# Patient Record
Sex: FEMALE | Race: WHITE | NOT HISPANIC OR LATINO | URBAN - METROPOLITAN AREA
[De-identification: names, ages, dates, MRNs, and addresses within clinical notes are randomized per-mention and may not be internally consistent; named-entity substitution may affect disease eponyms.]

---

## 2023-05-05 ENCOUNTER — OFFICE VISIT (OUTPATIENT)
Dept: PRIMARY CARE | Facility: CLINIC | Age: 38
End: 2023-05-05
Payer: COMMERCIAL

## 2023-05-05 VITALS
TEMPERATURE: 98.1 F | HEART RATE: 93 BPM | SYSTOLIC BLOOD PRESSURE: 136 MMHG | DIASTOLIC BLOOD PRESSURE: 83 MMHG | WEIGHT: 218.6 LBS | BODY MASS INDEX: 35.28 KG/M2

## 2023-05-05 DIAGNOSIS — J01.90 ACUTE NON-RECURRENT SINUSITIS, UNSPECIFIED LOCATION: ICD-10-CM

## 2023-05-05 DIAGNOSIS — H10.33 ACUTE BACTERIAL CONJUNCTIVITIS OF BOTH EYES: Primary | ICD-10-CM

## 2023-05-05 PROCEDURE — 99204 OFFICE O/P NEW MOD 45 MIN: CPT | Performed by: FAMILY MEDICINE

## 2023-05-05 PROCEDURE — 1036F TOBACCO NON-USER: CPT | Performed by: FAMILY MEDICINE

## 2023-05-05 RX ORDER — POLYMYXIN B SULFATE AND TRIMETHOPRIM 1; 10000 MG/ML; [USP'U]/ML
1 SOLUTION OPHTHALMIC EVERY 6 HOURS
Qty: 2 ML | Refills: 0 | COMMUNITY
Start: 2023-05-02 | End: 2023-05-09

## 2023-05-05 RX ORDER — GABAPENTIN 300 MG/1
CAPSULE ORAL
COMMUNITY
Start: 2022-11-30 | End: 2023-10-20 | Stop reason: SDUPTHER

## 2023-05-05 RX ORDER — AMOXICILLIN 875 MG/1
875 TABLET, FILM COATED ORAL 2 TIMES DAILY
Qty: 20 TABLET | Refills: 0 | Status: SHIPPED | OUTPATIENT
Start: 2023-05-05 | End: 2023-05-15

## 2023-05-05 RX ORDER — TOBRAMYCIN AND DEXAMETHASONE 3; 1 MG/ML; MG/ML
1 SUSPENSION/ DROPS OPHTHALMIC
Qty: 2.5 ML | Refills: 0 | Status: SHIPPED | OUTPATIENT
Start: 2023-05-05 | End: 2023-05-15

## 2023-05-05 RX ORDER — HYDROXYZINE PAMOATE 50 MG/1
CAPSULE ORAL
COMMUNITY
Start: 2022-10-28 | End: 2023-10-20

## 2023-05-05 NOTE — PROGRESS NOTES
Subjective   Patient ID: Florecita Redmond is a 37 y.o. female who presents for Establish Care.  HPI  The patient is here to establish care and for the management of bilateral eye irritation that started few days ago and has not responded to Polytrim eye drops used for the past 3 days. Associated to frontal and maxillary bilateral pressure/ pain, not responding to Flonase and Zyrtec.     Review of system was completed.  All systems were reviewed and were normal, except for the ones that are listed in the HPI.    Objective   Physical Exam  Constitutional:       Appearance: Normal appearance.   HENT:      Head: Normocephalic and atraumatic.      Right Ear: Tympanic membrane, ear canal and external ear normal.      Left Ear: Tympanic membrane, ear canal and external ear normal.      Nose: Nose normal.      Mouth/Throat:      Mouth: Mucous membranes are moist.      Pharynx: Oropharynx is clear.   Eyes:      General:         Right eye: Right eye discharge: ciprodex.      Extraocular Movements: Extraocular movements intact.      Pupils: Pupils are equal, round, and reactive to light.      Comments: Bilateral mild erythema of the conjunctiva. Left eyelids are edematous mildly.   Cardiovascular:      Rate and Rhythm: Normal rate and regular rhythm.      Pulses: Normal pulses.   Pulmonary:      Effort: Pulmonary effort is normal.      Breath sounds: Normal breath sounds.   Abdominal:      General: Abdomen is flat. Bowel sounds are normal.      Palpations: Abdomen is soft.   Musculoskeletal:         General: Normal range of motion.      Cervical back: Normal range of motion and neck supple.   Skin:     General: Skin is warm.   Neurological:      General: No focal deficit present.      Mental Status: She is alert and oriented to person, place, and time. Mental status is at baseline.   Psychiatric:         Mood and Affect: Mood normal.         Behavior: Behavior normal.         Thought Content: Thought content normal.          Judgment: Judgment normal.         Assessment/Plan   Problem List Items Addressed This Visit          Infectious/Inflammatory    Acute bacterial conjunctivitis of both eyes - Primary    Relevant Medications    tobramycin-dexamethasone (Tobradex) ophthalmic suspension    Acute non-recurrent sinusitis    Relevant Medications    amoxicillin (Amoxil) 875 mg tablet

## 2023-10-19 PROBLEM — F32.A ANXIETY AND DEPRESSION: Status: ACTIVE | Noted: 2018-05-23

## 2023-10-19 PROBLEM — H66.92 LEFT ACUTE OTITIS MEDIA: Status: ACTIVE | Noted: 2023-10-19

## 2023-10-19 PROBLEM — F32.9 MAJOR DEPRESSIVE DISORDER: Status: ACTIVE | Noted: 2018-08-03

## 2023-10-19 PROBLEM — F33.9 DEPRESSION, MAJOR, RECURRENT (CMS-HCC): Status: ACTIVE | Noted: 2023-10-19

## 2023-10-19 PROBLEM — G47.00 INSOMNIA: Status: ACTIVE | Noted: 2023-10-19

## 2023-10-19 PROBLEM — B00.9 HSV-2 INFECTION: Status: ACTIVE | Noted: 2018-05-23

## 2023-10-19 PROBLEM — R00.2 PALPITATIONS: Status: ACTIVE | Noted: 2023-10-19

## 2023-10-19 PROBLEM — F41.9 ANXIETY AND DEPRESSION: Status: ACTIVE | Noted: 2018-05-23

## 2023-10-19 PROBLEM — E66.811 OBESITY (BMI 30.0-34.9): Status: ACTIVE | Noted: 2018-05-25

## 2023-10-19 PROBLEM — H52.203 ASTIGMATISM, BILATERAL: Status: ACTIVE | Noted: 2023-10-19

## 2023-10-19 PROBLEM — I47.10 SUPRAVENTRICULAR TACHYCARDIA (CMS-HCC): Status: ACTIVE | Noted: 2018-09-25

## 2023-10-19 PROBLEM — E66.9 OBESITY (BMI 30.0-34.9): Status: ACTIVE | Noted: 2018-05-25

## 2023-10-19 RX ORDER — TRAZODONE HYDROCHLORIDE 100 MG/1
TABLET ORAL
COMMUNITY
Start: 2023-04-29 | End: 2024-04-05 | Stop reason: SDUPTHER

## 2023-10-19 RX ORDER — HYDROXYZINE HYDROCHLORIDE 50 MG/1
1 TABLET, FILM COATED ORAL NIGHTLY
COMMUNITY
Start: 2022-11-30 | End: 2023-10-20

## 2023-10-20 ENCOUNTER — TELEMEDICINE (OUTPATIENT)
Dept: BEHAVIORAL HEALTH | Facility: CLINIC | Age: 38
End: 2023-10-20
Payer: COMMERCIAL

## 2023-10-20 DIAGNOSIS — F51.01 PRIMARY INSOMNIA: ICD-10-CM

## 2023-10-20 DIAGNOSIS — F32.A ANXIETY AND DEPRESSION: ICD-10-CM

## 2023-10-20 DIAGNOSIS — F33.42 RECURRENT MAJOR DEPRESSIVE DISORDER, IN FULL REMISSION (CMS-HCC): ICD-10-CM

## 2023-10-20 DIAGNOSIS — F41.9 ANXIETY AND DEPRESSION: ICD-10-CM

## 2023-10-20 PROCEDURE — 99213 OFFICE O/P EST LOW 20 MIN: CPT

## 2023-10-20 RX ORDER — GABAPENTIN 300 MG/1
CAPSULE ORAL
Qty: 1 CAPSULE | Refills: 0 | Status: SHIPPED | OUTPATIENT
Start: 2023-10-20 | End: 2024-01-19 | Stop reason: SDUPTHER

## 2023-10-20 RX ORDER — VENLAFAXINE HYDROCHLORIDE 150 MG/1
150 CAPSULE, EXTENDED RELEASE ORAL
COMMUNITY
Start: 2023-07-28 | End: 2024-04-05 | Stop reason: SDUPTHER

## 2023-10-20 ASSESSMENT — ENCOUNTER SYMPTOMS
CARDIOVASCULAR NEGATIVE: 1
CONSTITUTIONAL NEGATIVE: 1
INSOMNIA: 1
MUSCULOSKELETAL NEGATIVE: 1
NEUROLOGICAL NEGATIVE: 1
GASTROINTESTINAL NEGATIVE: 1
RESPIRATORY NEGATIVE: 1

## 2023-10-20 NOTE — PATIENT INSTRUCTIONS
Plan  - Continue venlafaxine  mg daily for depression and anxiety  - Continue trazodone 100 mg at bedtime for sleep  - Continue gabapentin 300 mg at bedtime for sleep  - Follow up with physical health providers as scheduled    - May follow up sooner if experiences worsening symptoms by calling  Psychiatry at [phone]  *Please call the [facility] crisis hotline at 348 or call 911 or go to your closest Emergency Room if you feel worse. This includes thoughts of hurting yourself or anyone else, or having other troubles such as hearing voices, seeing visions, or having new and scary thoughts about the people around you.

## 2023-10-20 NOTE — PROGRESS NOTES
Subjective   Patient ID: Florecita Redmond is a 38 y.o. female who presents for Anxiety, MDD (Major Depressive Disorder), and Insomnia.    Things are going well, doing better with the change in weather. Enjoying the fall. Using her light lamp at work. Denies depressive sx. Denies SI/HI.  Anxiety is pretty good, normal level. Manageable. Denies panic attacks.     Sleep is good, easier to go to bed with the changing weather. Was able to get up and go to the gym in the morning before work.         Anxiety  Symptoms include insomnia.       Insomnia      Psychiatric Review Of Systems:  Depressive Symptoms:  denies  Manic Symptoms:Other: (comment) denies  Anxiety Symptoms:Other: (comment) at baseline, manageable    Disordered Eating Symptoms:Other: (comment) none reported  Inattentive Symptoms:Other: (comment) none reported    Trauma Symptoms:  denies  Psychotic Symptoms:Other: (comment) denies  Other Symptoms/Concerns:Other: (comments) none reported  Delirium/Altered Mental Status Symptoms:Other: (comment) WNL    Review of Systems   Constitutional: Negative.    HENT: Negative.     Respiratory: Negative.     Cardiovascular: Negative.    Gastrointestinal: Negative.    Musculoskeletal: Negative.    Neurological: Negative.    Psychiatric/Behavioral:  The patient has insomnia.      Past Psychiatric History   Onset History: Depression/anxiety at 16 yo.   Inpatient history: 2022 [place].   Suicide attempts/Self-Harm/Ideation History: SI in 2022.   Past providers: Lisa Al NP, Dr Dick De Los Santos, [facility].   Past medication trials: Viibryd- made depression worse, Lexapro- not effective, Latuda- 1st time helped, 2nd time not effective, bupropion  mg possible tinnitus, buspirone 5 mg BID, trazodone 50 mg, hydroxyzine 50 mg.     Social History:   Upbringing: Born and raised in [place]. One older sister. Parents , overall childhood was loving and supportive.   Trauma: Denies hx of abuse      Education:  "Doctorate in Veterinary Medicine  Work:  Oncologist   Marital Status:   Children: 2 children. boy 5 yo, 18 mo girl  Living situation: Lived with  and children  : Denies  Legal: Denies    Objective   MSE  Appearance: well-groomed, appropriate, appears stated age   Orientation: alert, oriented x3.   Build: average.   Demeanor:  engaging.   Behavior: Appropriate eye contact. Cooperative   Motor Activity: no psychomotor agitation/retardation, no tremor/EPS noted, gait not assessed 2/2 virtual visit.   Speech: Regular rate, rhythm, volume and tone, spontaneous, fluent, clear.  Language: Neurologic language is intact.   Mood:. \"good\".   Affect: Euthymic, mood congruent, appropriate to content.   Perception: Does not endorse auditory or visual hallucinations, does not appear to be responding to hallucinatory stimuli.  Thought process:  Organized, linear, logical associations.    Thought association: displays rational thought process.   Content of thought: As noted in HPI. Does not endorse suicidal or homicidal ideation. No delusions/paranoia elicited.   Fund of Knowledge: intact fund of knowledge, aware of current events.   Abstract/ Rational Thought: intact   Memory: grossly intact.   Attention/Concentration: normal.   Cognition: intact.   Executive Function: intact.   Self Harm: None Reported.   Aggressive: None Reported.   Insight: Good, as patient recognizes symptoms of illness and need for recommended treatments.  Judgement: Can make reasonable decisions about ordinary activities of daily living and necessary medical care recommendations.    Lab Review:   not applicable    Assessment/Plan   Florecita is doing well, she is managing the weather change without difficulty and is enjoying the fall weather. Will continue medication regimen and follow up in three months. Imminent risk of harm to self and others is low at this time. Protective factors include no previous attempts, no drug abuse, " rational thinking intact, good social support, , help seeking, no SI, hopeful, future oriented and no firearms at home.    DX:  MDD, in remission  JOSE  Insomnia    Time:  Prep 2 min  Time with pt: 13 min  Documentation: 10 min

## 2023-11-14 ENCOUNTER — TELEPHONE (OUTPATIENT)
Dept: OPHTHALMOLOGY | Facility: CLINIC | Age: 38
End: 2023-11-14
Payer: COMMERCIAL

## 2023-12-15 ENCOUNTER — OFFICE VISIT (OUTPATIENT)
Dept: PRIMARY CARE | Facility: CLINIC | Age: 38
End: 2023-12-15
Payer: COMMERCIAL

## 2023-12-15 VITALS
HEIGHT: 66 IN | DIASTOLIC BLOOD PRESSURE: 70 MMHG | BODY MASS INDEX: 35.68 KG/M2 | OXYGEN SATURATION: 98 % | WEIGHT: 222 LBS | SYSTOLIC BLOOD PRESSURE: 114 MMHG | RESPIRATION RATE: 18 BRPM | HEART RATE: 86 BPM | TEMPERATURE: 98.6 F

## 2023-12-15 DIAGNOSIS — Z00.00 ROUTINE GENERAL MEDICAL EXAMINATION AT A HEALTH CARE FACILITY: Primary | ICD-10-CM

## 2023-12-15 DIAGNOSIS — J30.2 SEASONAL ALLERGIC RHINITIS, UNSPECIFIED TRIGGER: ICD-10-CM

## 2023-12-15 DIAGNOSIS — G43.109 MIGRAINE WITH AURA AND WITHOUT STATUS MIGRAINOSUS, NOT INTRACTABLE: ICD-10-CM

## 2023-12-15 PROCEDURE — 3008F BODY MASS INDEX DOCD: CPT | Performed by: NURSE PRACTITIONER

## 2023-12-15 PROCEDURE — 1036F TOBACCO NON-USER: CPT | Performed by: NURSE PRACTITIONER

## 2023-12-15 PROCEDURE — 99395 PREV VISIT EST AGE 18-39: CPT | Performed by: NURSE PRACTITIONER

## 2023-12-15 PROCEDURE — 90686 IIV4 VACC NO PRSV 0.5 ML IM: CPT | Performed by: NURSE PRACTITIONER

## 2023-12-15 PROCEDURE — 90471 IMMUNIZATION ADMIN: CPT | Performed by: NURSE PRACTITIONER

## 2023-12-15 RX ORDER — FLUTICASONE PROPIONATE 50 MCG
1 SPRAY, SUSPENSION (ML) NASAL DAILY
Qty: 16 G | Refills: 3 | Status: SHIPPED | OUTPATIENT
Start: 2023-12-15 | End: 2024-12-14

## 2023-12-15 RX ORDER — SUMATRIPTAN 50 MG/1
50 TABLET, FILM COATED ORAL ONCE AS NEEDED
Qty: 27 TABLET | Refills: 3 | Status: SHIPPED | OUTPATIENT
Start: 2023-12-15 | End: 2024-12-14

## 2023-12-15 ASSESSMENT — COLUMBIA-SUICIDE SEVERITY RATING SCALE - C-SSRS
1. IN THE PAST MONTH, HAVE YOU WISHED YOU WERE DEAD OR WISHED YOU COULD GO TO SLEEP AND NOT WAKE UP?: NO
6. HAVE YOU EVER DONE ANYTHING, STARTED TO DO ANYTHING, OR PREPARED TO DO ANYTHING TO END YOUR LIFE?: NO
2. HAVE YOU ACTUALLY HAD ANY THOUGHTS OF KILLING YOURSELF?: NO

## 2023-12-15 ASSESSMENT — ENCOUNTER SYMPTOMS
BACK PAIN: 0
CONSTIPATION: 0
MYALGIAS: 0
UNEXPECTED WEIGHT CHANGE: 0
NERVOUS/ANXIOUS: 0
SHORTNESS OF BREATH: 0
DIARRHEA: 0
DYSURIA: 0
PALPITATIONS: 0
FATIGUE: 0
FREQUENCY: 0
COUGH: 0
APPETITE CHANGE: 0
ARTHRALGIAS: 0
VOMITING: 0
NAUSEA: 0
ABDOMINAL PAIN: 0
BLOOD IN STOOL: 0

## 2023-12-15 ASSESSMENT — PAIN SCALES - GENERAL: PAINLEVEL: 0-NO PAIN

## 2023-12-15 ASSESSMENT — PATIENT HEALTH QUESTIONNAIRE - PHQ9
1. LITTLE INTEREST OR PLEASURE IN DOING THINGS: NOT AT ALL
2. FEELING DOWN, DEPRESSED OR HOPELESS: NOT AT ALL
SUM OF ALL RESPONSES TO PHQ9 QUESTIONS 1 AND 2: 0

## 2023-12-15 NOTE — PROGRESS NOTES
Adolfo Redmond is a 38 y.o. female and is here for a comprehensive physical exam. The patient reports no problems.    Has been having a lot headaches and sinus pain - frontal  Light, sound sensitive, pain until sleeping (resolves)  Worse in spring and fall due to allergies  Multiple headaches a week, 3 HA this week  Ibuprofen taken, does help with some Migraines  Allergy related, flonase - occasional   No headache today  Triggers - at end of work day  Has had increased stress at work  BP has been elevated outside - 150/88 on 2022. 160/? At home    Scheduled with ENT for BPPV follow up.  BPPV suspected when seen in February     History:  LMP: No LMP recorded.  Menopause at NA years  Last pap date: Per gynecology  Abnormal pap? no  : 2  Para: 2    Do you have pain that bothers you in your daily life? no    Review of Systems   Constitutional:  Negative for appetite change, fatigue and unexpected weight change.   HENT:  Negative for congestion, ear pain and hearing loss.    Eyes:  Negative for visual disturbance.   Respiratory:  Negative for cough and shortness of breath.    Cardiovascular:  Negative for chest pain, palpitations and leg swelling.   Gastrointestinal:  Negative for abdominal pain, blood in stool, constipation, diarrhea, nausea and vomiting.   Genitourinary:  Negative for dysuria, frequency and urgency.   Musculoskeletal:  Negative for arthralgias, back pain and myalgias.   Skin:  Negative for rash.   Neurological:  Positive for headaches. Negative for syncope.   Psychiatric/Behavioral:  The patient is not nervous/anxious.         Objective   Physical Exam  Vitals and nursing note reviewed.   Constitutional:       General: She is not in acute distress.     Appearance: Normal appearance. She is obese. She is not toxic-appearing.   HENT:      Head: Normocephalic.      Right Ear: Tympanic membrane, ear canal and external ear normal.      Left Ear: Tympanic membrane, ear canal and  external ear normal.      Nose:      Right Turbinates: Swollen.      Left Turbinates: Swollen.      Right Sinus: Frontal sinus tenderness present.      Left Sinus: Frontal sinus tenderness present.      Mouth/Throat:      Mouth: Mucous membranes are moist.      Pharynx: Oropharynx is clear.   Eyes:      Conjunctiva/sclera: Conjunctivae normal.   Neck:      Thyroid: No thyromegaly.   Cardiovascular:      Rate and Rhythm: Normal rate and regular rhythm.      Pulses: Normal pulses.      Heart sounds: Normal heart sounds. No murmur heard.  Pulmonary:      Effort: Pulmonary effort is normal. No respiratory distress.      Breath sounds: Normal breath sounds.   Abdominal:      General: Bowel sounds are normal.      Palpations: Abdomen is soft.      Tenderness: There is no abdominal tenderness.   Musculoskeletal:         General: Normal range of motion.      Cervical back: Neck supple.      Right lower leg: No edema.      Left lower leg: No edema.   Lymphadenopathy:      Cervical: Cervical adenopathy present.   Skin:     General: Skin is warm and dry.      Capillary Refill: Capillary refill takes less than 2 seconds.      Findings: No rash.   Neurological:      General: No focal deficit present.      Gait: Gait normal.   Psychiatric:         Mood and Affect: Mood normal.         Judgment: Judgment normal.         Assessment/Plan   Healthy female exam.      1. Headaches: Treat allergies with Flonase.  Trial of Imitrex at onset of migraine to abort headache. May repeat in 2 hours if needed.  2. Patient Counseling:  --Nutrition: Stressed importance of moderation in sodium/caffeine intake, saturated fat and cholesterol, caloric balance, sufficient intake of fresh fruits, vegetables, fiber, calcium, iron, and 1 mg of folate supplement per day (for females capable of pregnancy).  --Discussed the issue of estrogen replacement, calcium supplement, and the daily use of baby aspirin.  --Exercise: Stressed the importance of regular  exercise.   --Substance Abuse: Discussed cessation/primary prevention of tobacco, alcohol, or other drug use; driving or other dangerous activities under the influence; availability of treatment for abuse.    --Sexuality: Discussed sexually transmitted diseases, partner selection, use of condoms, avoidance of unintended pregnancy  and contraceptive alternatives.   --Injury prevention: Discussed safety belts, safety helmets, smoke detector, smoking near bedding or upholstery.   --Dental health: Discussed importance of regular tooth brushing, flossing, and dental visits.  --Immunizations reviewed.  --Discussed benefits of screening colonoscopy.  --After hours service discussed with patient  3. Discussed the patient's BMI with her.  The BMI is above average. The patient received Provided instructions on dietary changes  Provided instructions on exercise  Advised to Increase physical activity because they have an above normal BMI.  4. Follow up in 3 months

## 2023-12-28 ASSESSMENT — ENCOUNTER SYMPTOMS: HEADACHES: 1

## 2023-12-29 ENCOUNTER — LAB (OUTPATIENT)
Dept: LAB | Facility: LAB | Age: 38
End: 2023-12-29
Payer: COMMERCIAL

## 2023-12-29 DIAGNOSIS — Z00.00 ROUTINE GENERAL MEDICAL EXAMINATION AT A HEALTH CARE FACILITY: ICD-10-CM

## 2023-12-29 LAB
ALBUMIN SERPL BCP-MCNC: 4.2 G/DL (ref 3.4–5)
ALP SERPL-CCNC: 60 U/L (ref 33–110)
ALT SERPL W P-5'-P-CCNC: 11 U/L (ref 7–45)
ANION GAP SERPL CALC-SCNC: 11 MMOL/L (ref 10–20)
AST SERPL W P-5'-P-CCNC: 14 U/L (ref 9–39)
BASOPHILS # BLD AUTO: 0.03 X10*3/UL (ref 0–0.1)
BASOPHILS NFR BLD AUTO: 0.5 %
BILIRUB SERPL-MCNC: 0.4 MG/DL (ref 0–1.2)
BUN SERPL-MCNC: 18 MG/DL (ref 6–23)
CALCIUM SERPL-MCNC: 9.2 MG/DL (ref 8.6–10.3)
CHLORIDE SERPL-SCNC: 104 MMOL/L (ref 98–107)
CHOLEST SERPL-MCNC: 215 MG/DL (ref 133–200)
CHOLEST/HDLC SERPL: 4.1 {RATIO}
CO2 SERPL-SCNC: 27 MMOL/L (ref 21–32)
CREAT SERPL-MCNC: 0.82 MG/DL (ref 0.5–1.05)
EOSINOPHIL # BLD AUTO: 0.17 X10*3/UL (ref 0–0.7)
EOSINOPHIL NFR BLD AUTO: 3 %
ERYTHROCYTE [DISTWIDTH] IN BLOOD BY AUTOMATED COUNT: 12.3 % (ref 11.5–14.5)
GFR SERPL CREATININE-BSD FRML MDRD: >90 ML/MIN/1.73M*2
GLUCOSE SERPL-MCNC: 109 MG/DL (ref 74–99)
HCT VFR BLD AUTO: 39.5 % (ref 36–46)
HDLC SERPL-MCNC: 52 MG/DL
HGB BLD-MCNC: 12.7 G/DL (ref 12–16)
IMM GRANULOCYTES # BLD AUTO: 0.02 X10*3/UL (ref 0–0.7)
IMM GRANULOCYTES NFR BLD AUTO: 0.4 % (ref 0–0.9)
LDLC SERPL CALC-MCNC: 144 MG/DL (ref 65–130)
LYMPHOCYTES # BLD AUTO: 1.62 X10*3/UL (ref 1.2–4.8)
LYMPHOCYTES NFR BLD AUTO: 28.7 %
MCH RBC QN AUTO: 28 PG (ref 26–34)
MCHC RBC AUTO-ENTMCNC: 32.2 G/DL (ref 32–36)
MCV RBC AUTO: 87 FL (ref 80–100)
MONOCYTES # BLD AUTO: 0.61 X10*3/UL (ref 0.1–1)
MONOCYTES NFR BLD AUTO: 10.8 %
NEUTROPHILS # BLD AUTO: 3.2 X10*3/UL (ref 1.2–7.7)
NEUTROPHILS NFR BLD AUTO: 56.6 %
NRBC BLD-RTO: NORMAL /100{WBCS}
PLATELET # BLD AUTO: 214 X10*3/UL (ref 150–450)
POTASSIUM SERPL-SCNC: 4.6 MMOL/L (ref 3.5–5.3)
PROT SERPL-MCNC: 6.6 G/DL (ref 6.4–8.2)
RBC # BLD AUTO: 4.53 X10*6/UL (ref 4–5.2)
SODIUM SERPL-SCNC: 137 MMOL/L (ref 136–145)
T4 FREE SERPL-MCNC: 1 NG/DL (ref 0.9–1.7)
TRIGL SERPL-MCNC: 93 MG/DL (ref 40–150)
TSH SERPL DL<=0.05 MIU/L-ACNC: 5.03 MIU/L (ref 0.27–4.2)
WBC # BLD AUTO: 5.7 X10*3/UL (ref 4.4–11.3)

## 2023-12-29 PROCEDURE — 84443 ASSAY THYROID STIM HORMONE: CPT

## 2023-12-29 PROCEDURE — 80061 LIPID PANEL: CPT

## 2023-12-29 PROCEDURE — 36415 COLL VENOUS BLD VENIPUNCTURE: CPT

## 2023-12-29 PROCEDURE — 85025 COMPLETE CBC W/AUTO DIFF WBC: CPT

## 2023-12-29 PROCEDURE — 84439 ASSAY OF FREE THYROXINE: CPT

## 2023-12-29 PROCEDURE — 80053 COMPREHEN METABOLIC PANEL: CPT

## 2024-01-12 ENCOUNTER — APPOINTMENT (OUTPATIENT)
Dept: BEHAVIORAL HEALTH | Facility: CLINIC | Age: 39
End: 2024-01-12
Payer: COMMERCIAL

## 2024-01-19 ENCOUNTER — TELEMEDICINE (OUTPATIENT)
Dept: BEHAVIORAL HEALTH | Facility: CLINIC | Age: 39
End: 2024-01-19
Payer: COMMERCIAL

## 2024-01-19 DIAGNOSIS — F41.9 ANXIETY AND DEPRESSION: ICD-10-CM

## 2024-01-19 DIAGNOSIS — F33.40 RECURRENT MAJOR DEPRESSIVE DISORDER, IN REMISSION (CMS-HCC): ICD-10-CM

## 2024-01-19 DIAGNOSIS — F32.A ANXIETY AND DEPRESSION: ICD-10-CM

## 2024-01-19 DIAGNOSIS — F51.01 PRIMARY INSOMNIA: ICD-10-CM

## 2024-01-19 PROCEDURE — 99213 OFFICE O/P EST LOW 20 MIN: CPT

## 2024-01-19 RX ORDER — GABAPENTIN 300 MG/1
300 CAPSULE ORAL NIGHTLY
Qty: 90 CAPSULE | Refills: 0 | Status: SHIPPED | OUTPATIENT
Start: 2024-01-19 | End: 2024-04-05 | Stop reason: SDUPTHER

## 2024-01-19 NOTE — PATIENT INSTRUCTIONS
Plan/Recommendations:  Medications:    -Continue gabapentin 300mg at bedtime for anxiety   -Continue trazodone 100 mg at bedtime as needed for sleep   - Continue venlafaxine  mg daily for depression and anxiety  Follow up: April 5th 1000  Call  Psychiatry at [phone] with issues.  For [facility] residents, [employer] is a 24/7 hotline you can call for assistance [[phone]]. Please call [phone] or go to your closest Emergency Room if you feel unsafe. This includes thoughts of hurting yourself or anyone else, or having other troubles such as hearing voices, seeing visions, or having new and scary thoughts about the people around you.

## 2024-01-19 NOTE — PROGRESS NOTES
Outpatient Psychiatry- Follow up visit    Subjective   Florecita Redmond, a 38 y.o. female, presenting for follow up visit for   Chief Complaint   Patient presents with    MDD (Major Depressive Disorder)    Anxiety        HPI:  Florecita states things are going well, holidays were nice, went to [place] for Thanksgiving and [place].     NOTE: Symptom scale is rated where 0 = no symptoms at all, and 10 = symptoms so severe that pt is an imminent danger to themselves or others and requires hospitalization.    Anxiety and Depression remains present less days than not, which has improved over the past few weeks. Florecita Redmond rates the severity of depression symptoms as a 0/10, anxiety at 4/10.    Mood is good, denies depressive sx, denies SI/HI.  Using light therapy. Managing seasonal depression well.    Anxiety is manageable; some work related anxiety. 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    Sleep is good, getting 7-8hrs do sleep a night. Denies problems falling or staying asleep.     Psychiatric Review Of Systems:  Depressive Symptoms: negative  Manic Symptoms: negative  Anxiety Symptoms: General Anxiety Disorder (JOSE)JOSE Behaviors: manageable  Psychotic Symptoms: negative  Trauma Symptoms: None  Other Symptoms/Concerns: none noted  Delirium/Altered Mental Status Symptoms:Other: (comment) WNL    Current Medications:    Current Outpatient Medications:     fluticasone (Flonase) 50 mcg/actuation nasal spray, Administer 1 spray into each nostril once daily. Shake gently. Before first use, prime pump. After use, clean tip and replace cap., Disp: 16 g, Rfl: 3    gabapentin (Neurontin) 300 mg capsule, Take one tab at bedtime, Disp: 1 capsule, Rfl: 0    SUMAtriptan (Imitrex) 50 mg tablet, Take 1 tablet (50 mg) by mouth 1 time if needed for migraine. May repeat after 2 hours., Disp: 27 tablet, Rfl: 3    traZODone (Desyrel) 100 mg tablet, Take by mouth., Disp: , Rfl:     venlafaxine XR (Effexor-XR) 150  mg 24 hr capsule, Take 1 capsule (150 mg) by mouth once daily in the morning. Take before meals., Disp: , Rfl:     Medical History:  Past Medical History:   Diagnosis Date    Allergic     Anxiety 2003    Depression 2003    GERD (gastroesophageal reflux disease) 2007    Headache 2010    Personal history of other complications of pregnancy, childbirth and the puerperium 07/29/2021    History of postpartum depression       Past Psychiatric History:   Onset History: Depression/anxiety at 16 yo.   Inpatient history: 2022 [place].   Suicide attempts/Self-Harm/Ideation History: SI in 2022.   Past providers: Lisa Al, CHRISTOPHER, Dr Dick De Los Santos, [facility].   Past medication trials: Viibryd- made depression worse, Lexapro- not effective, Latuda- 1st time helped, 2nd time not effective, bupropion  mg possible tinnitus, buspirone 5 mg BID, trazodone 50 mg, hydroxyzine 50 mg.       Family Psychiatric History  Maternal Grandmother    · Family history of depression      Social History:   Upbringing: Born and raised in [place]. One older sister. Parents , overall childhood was loving and supportive.   Trauma: Denies hx of abuse  Education: Doctorate in Veterinary Medicine  Work:  Oncologist   Marital Status:   Children: 2 children. boy 5 yo, 18 mo girl  Living situation: Lived with  and children  : Denies  Legal: Denies  Access to Weapons: No firearms in household  Guardian/POA/Payee:  self    Substance Use History:  Tobacco use: denies  Use of alcohol: denied  Use of caffeine: caffeinated soft drinks 2 /day  Use of other substances: denies  Legal consequences of substance use: denies  Substance use disorder treatment: n/a    Record Review: brief     Medical Review Of Systems:  A comprehensive review of systems was negative except for: Ears, nose, mouth, throat, and face: positive for nasal congestion and sore throat    MEDICAL HISTORY  -PCP: Ghislaine Munguia,  "APRN-CNP  -Pt reports currently is not pregnant, and currently is sexually active, had a tubal. LMP: 1/4/23    -TBI/head trauma/LOC/seizure hx: denies      Objective   Mental Status Exam  Appearance: Neat and clean in appearance, dressed appropriately.   Attitude: Calm, cooperative, and engaged in conversation.  Behavior: Appropriate eye contact.   Motor Activity: No psychomotor agitation or retardation. No abnormal movements, tremors or tics. No evidence of extrapyramidal symptoms or tardive dyskinesia.  Speech: Regular rate, rhythm, volume. Spontaneous, no pressured speech.  Mood: \"good\"  Affect: Euthymic, full range, mood congruent.  Thought Process: Linear, logical, and goal-directed. No loose associations or gross thought disorganization.  Thought Content: Denied current suicidal ideation or thoughts of harm to self, denied homicidal ideation or thoughts of harm to others. No delusional thinking elicited. No perseverations or obsessions identified.   Perception: Did not endorse auditory or visual hallucinations, did not appear to be responding to hallucinatory stimuli.   Cognition: Alert, oriented x3. Preserved attention span and concentration, recent and remote memory. Adequate fund of knowledge. No deficits in language.   Insight: Good, in regards to understanding mental health condition  Judgement: Intact      Vitals:  There were no vitals filed for this visit.    Risk Assessment:  SI/HI ASSESSMENT  -Risk Assessment: Florecita Redmond is currently a low acute risk of suicide and self-harm due to no past suicide attempt(s) and not currently endorsing thoughts of suicide. Florecita Redmond is currently a low acute risk of violence and harm to others due to no past history of violence and not currently threatening others.  -Suicidal Risk Factors:   -Violence Risk Factors: none  -Protective Factors: sense of responsibility towards family, social support/connectedness, child related concerns/living with child <18 " years, positive family relationships, hopefulness/future orientation, marriage/partnership, and employment  -Plan to Reduce Risk: Establish medication regimen and outpatient follow-up care    Florecita was seen today for mdd (major depressive disorder) and anxiety.  Diagnoses and all orders for this visit:  Recurrent major depressive disorder, in remission (CMS/HCC)  Anxiety and depression       Impression:  Florecita is doing well; her depression and SAD is well managed. Will continue current medication regimen and follow up in three months.     Plan/Recommendations:  Medications:    -Continue gabapentin 300mg at bedtime for anxiety   -Continue trazodone 100 mg at bedtime as needed for sleep   - Continue venlafaxine  mg daily for depression and anxiety  Follow up: April 5th 1000  Call  Psychiatry at [phone] with issues.  For [facility] residents, [employer] is a 24/7 hotline you can call for assistance [[phone]]. Please call [phone] or go to your closest Emergency Room if you feel unsafe. This includes thoughts of hurting yourself or anyone else, or having other troubles such as hearing voices, seeing visions, or having new and scary thoughts about the people around you.    Review with patient: Treatment plan reviewed with the patient.    Time Spent:  Prep time: 1 min  Direct patient time: 20 min  Documentation time: 7 min  Total time: 28 min    HALLE Koenig-CNP

## 2024-01-26 ENCOUNTER — CLINICAL SUPPORT (OUTPATIENT)
Dept: AUDIOLOGY | Facility: CLINIC | Age: 39
End: 2024-01-26
Payer: COMMERCIAL

## 2024-01-26 DIAGNOSIS — Z01.10 ENCOUNTER FOR HEARING EXAMINATION WITHOUT ABNORMAL FINDINGS: ICD-10-CM

## 2024-01-26 DIAGNOSIS — R42 DIZZINESS: Primary | ICD-10-CM

## 2024-01-26 PROCEDURE — 92550 TYMPANOMETRY & REFLEX THRESH: CPT | Performed by: AUDIOLOGIST

## 2024-01-26 PROCEDURE — 92557 COMPREHENSIVE HEARING TEST: CPT | Performed by: AUDIOLOGIST

## 2024-01-26 ASSESSMENT — PAIN SCALES - GENERAL: PAINLEVEL_OUTOF10: 0 - NO PAIN

## 2024-01-26 ASSESSMENT — PAIN - FUNCTIONAL ASSESSMENT: PAIN_FUNCTIONAL_ASSESSMENT: 0-10

## 2024-01-26 NOTE — PROGRESS NOTES
HISTORY:  Florecita was seen today for a hearing evaluation before her ENT visit.  She states that she has been getting random dizziness.  This happened when she bends down, rolls over or leans her head back.    No tinnitus  No hearing difficulties  No aural fullness  No pain  No falls.       RESULTS:  Otoscopic inspection showed a clear left ear canal and mild wax in the right ear.   Immittance testing showed normal tympanograms bilaterally.   Ipsilateral acoustic reflexes were tested at 500-4000Hz in both ears.  They were present at 500-4000Hz in both ears.    Pure tone air and bone conduction testing showed normal hearing at 250-8000Hz in both ears.    Word discrimination scores were excellent bilaterally.    IMPRESSIONS:  Normal hearing and normal middle ear function in both ears.      Treatment Plan:   Follow up with ENT  Retest hearing in conjunction with otologic care.     TIME:    2317-3651

## 2024-01-26 NOTE — PROGRESS NOTES
"ADULT AUDIOMETRIC EVALUATION    *** Seeing Miroslava next week.  Name:  Florecita Redmond  :  1985  Age:  38 y.o.  Date of Evaluation:  ***    IMPRESSIONS     Today's test results are consistent with ***. Discussed results and recommendations with patient.  Questions were addressed and the patient was encouraged to contact our department should concerns arise.    RECOMMENDATIONS     Continue medical follow up with PCP/ENT.  Return for evaluation following any medical management.     Time: ***    HISTORY     Florecita Redmond is seen today in conjunction with ENT appointment.  Patient reported ***. Patient denied history of tinnitus, dizziness, aural fullness, or excessive noise exposure. No history of hearing aid use.    TEST RESULTS     Otoscopic Evaluation:  Physical exam to evaluate the outer ear  Right Ear: Clear ear canal.  Left Ear: Clear ear canal.    Tympanometry & Acoustic Reflexes:  Assesses the function of the middle ear and inner ear structures  Right Ear: {tympanometry:34828::\"Tympanometry revealed normal ear canal volume, peak pressure, and compliance, consistent with normal middle ear function (Type A).\"} Ipsilateral Acoustic Reflexes were *** at *** Hz.  Left Ear: {tympanometry:63992::\"Tympanometry revealed normal ear canal volume, peak pressure, and compliance, consistent with normal middle ear function (Type A).\"} Ipsilateral Acoustic Reflexes were *** at *** Hz.    Distortion Product Otoacoustic Emissions: Assesses the cochlear outer hair cell function (8809-1642 Hz frequency range)  DNT.    Pure Tone Audiometry:  {method:60218::\"Conventional Audiometry\"} via {transducer:35955::\"inserts\"} with {Reliability:37911::\"good\"} reliability  Right Ear: ***  Left Ear: ***    Speech Audiometry:   Right Ear:  Speech Reception Threshold (SRT) was obtained at *** dBHL. Speech reception threshold in agreement with pure tone average. Word Recognition scores were {DESC; EXCELLENT/GOOD/FAIR:73593} (***%) in quiet when " words were presented at *** dBHL.   Left Ear:  Speech Reception Threshold (SRT) was obtained at *** dBHL. Speech reception threshold in agreement with pure tone average. Word Recognition scores were {DESC; EXCELLENT/GOOD/FAIR:40977} (***%) in quiet when words were presented at *** dBHL.       Testing and interpretation of results completed Tashi Oglesby CCC-A CCVR. It was my pleasure to evaluate this patient.       TASHI Oglesby, CCC-A CCVR  Senior Clinical Vestibular Audiologist    Degree of Hearing Sensitivity Decibel Range   Within Normal Limits (WNL) 0-25   Mild 26-40   Moderate 41-55   Moderately-Severe 56-70   Severe 71-90   Profound 91+      Key   CNT/DNT Could Not Test/Did Not Test   TM Tympanic Membrane   WNL Within Normal Limits   HA Hearing Aid   SNHL Sensorineural Hearing Loss   CHL Conductive Hearing Loss   NIHL Noise-Induced Hearing Loss   ECV Ear Canal Volume   RE/LE Right Ear/Left Ear        AUDIOGRAM

## 2024-02-02 ENCOUNTER — OFFICE VISIT (OUTPATIENT)
Dept: OTOLARYNGOLOGY | Facility: CLINIC | Age: 39
End: 2024-02-02
Payer: COMMERCIAL

## 2024-02-02 VITALS — WEIGHT: 227.6 LBS | HEIGHT: 66 IN | TEMPERATURE: 97.2 F | BODY MASS INDEX: 36.58 KG/M2

## 2024-02-02 DIAGNOSIS — R42 VERTIGO: Primary | ICD-10-CM

## 2024-02-02 DIAGNOSIS — H61.21 IMPACTED CERUMEN OF RIGHT EAR: ICD-10-CM

## 2024-02-02 PROCEDURE — 99204 OFFICE O/P NEW MOD 45 MIN: CPT | Performed by: NURSE PRACTITIONER

## 2024-02-02 PROCEDURE — 1036F TOBACCO NON-USER: CPT | Performed by: NURSE PRACTITIONER

## 2024-02-02 PROCEDURE — 69210 REMOVE IMPACTED EAR WAX UNI: CPT | Performed by: NURSE PRACTITIONER

## 2024-02-02 PROCEDURE — 3008F BODY MASS INDEX DOCD: CPT | Performed by: NURSE PRACTITIONER

## 2024-02-02 ASSESSMENT — PATIENT HEALTH QUESTIONNAIRE - PHQ9
SUM OF ALL RESPONSES TO PHQ9 QUESTIONS 1 AND 2: 0
2. FEELING DOWN, DEPRESSED OR HOPELESS: NOT AT ALL
1. LITTLE INTEREST OR PLEASURE IN DOING THINGS: NOT AT ALL

## 2024-02-02 NOTE — PROGRESS NOTES
MIGRAINE / HYPERSENSITIVITY DIET        BREAD  Acceptable purchases: Any white, wheat, rye or pumpernickel store-bought bread. Plain or sesame seed bagels, English muffins, quick breads like pumpernickel or zucchini breads. All yeast bread must be 24 hours old.     What to avoid: Fresh baked bread, either homemade or from the grocer's bakery, fresh donuts, fresh breakfast Italian, nut breads, cheese bread, chocolate bread, raisin bread, bagels with dried fruit like blueberry or cranberry bagels. Remember that pizza is fresh bread.     CEREAL   Acceptable purchases: Many cereals are fine. For example: Cheerios, Life, Honey Bunches of Oats, Cracklin' Bran, Frosted Flakes, Frosted Shredded Wheat.     What to avoid: Cereal with nuts, raisins, chocolate, dried fruit, aspartame, peanut butter or coconut.     CRACKERS  Acceptable purchases: Any unflavored cracker such as Saltines, Ritz, Wheat thins, Kee's Table Crackers and Club crackers.     What to avoid: Cheddar cheese crackers, Chick-in-a-bisket, any flavored cracker.     PRETZELS/CHIPS   Acceptable purchases: All plain pretzels and plain potato chips, Tostitos 100% corn chips, Frito's corn chips, Vee's salt and vinegar chips.     What to avoid: Soft pretzels, honey and mustard pretzels, onion and garlic pretzels or other seasoned pretzels. Avoid Pringles, Doritos Eliu chips, jalapeno chips and most other seasoned chips.    PIES/CAKES/COOKIES/CANDY   Acceptable purchases: Blueberry and apple store bought pies if made without lemon juice, vanilla or cinnamon swirl cake, shortbread cookies and vanilla/strawberry wafers, oatmeal cookies without the raisins, rice pudding (no raisins), white chocolate.    What to avoid: Chocolate, chocolate candy, nuts, buttermilk, sour cream, dried fruit (some apricot pies start with dried apricots), peanut butter, lemon extract or lemon juice, almond extract and coconut. Avoid diet and sugar-free products that contain aspartame.      SALAD DRESSING   Acceptable purchases: Any oil and distilled white vinegar. (Homemade ranch is good but you won't find that in the grocery store).     What to avoid: most bottled dressings have one or many of the following; monosodium glutamate, onion or onion powder, grated cheese like Mcgee or parmesan, natural flavoring, red wine vinegar or balsamic vinegar (or anything other than white).     DIPS/SAUCES   Acceptable purchases: buy ingredients to make your own at home.     What to avoid: dips and sauces usually contain MSG (natural flavoring) or onions. Avoid salsa, chips dips, tomato sauce like Ragu, eligio or pesto sauce, gravy, mustard dips, barbeque sauce and guacamole (because of the avocados).     MEAT AND MAIN MEALS   Acceptable purchases: Fresh chicken, beef, veal, lamb, fish, turkey or pork. (Some sausage is made without MSG, natural flavor or onion). Be sure the meat is not injected with a tenderizer (like [employer]'s Simple Tender pork products) or with broth (some turkey and chicken).    What to avoid: Beef liver and chicken liver, marinated meat, ready-made hot wings, barbeque chicken, breaded meat like fried chicken or nuggets or breaded chicken patties, seasoned rotisserie chicken, and any ready-made meal of meat, noodle or rice like burritos, lasagna, Rice-a-Darron and Hamburger [place]. Any canned tuna with broth. Anchovies. Spam. Canned soups have MSG and sometimes onions. Avoid nitrites in ham, hot dogs and most lunchmeats.     DAIRY PRODUCTS   Acceptable purchases: Deli American cheese, American cheese with jalapeno peppers, cottage cheese, ricotta cheese and cream cheese. White milk is ok.    What to avoid: Aged cheeses like Cheddar, [place] Spenser, Heath and Swiss. Avoid mozzarella cheese, Brie, sour cream buttermilk and yogurt. Beware of products made with cheese like pizza and hot pockets. Avoid chocolate milk due to the caffeine.    FRUITS/JUICES   Acceptable purchases: Fresh  strawberries, apples, pears, grapes, peaches, nectarines, blueberries, kiwi, apricots, blackberries, cherries, cantaloupes, mangoes, honeydew melon and watermelon.     What to avoid: Bananas, oranges, grapefruit, efraín, limes, tangerines, pineapples, Clementines, raspberries, plums, papayas, passion fruit, figs, dates, raisins and avocados. Also avoid dried fruits preserved with sulfites.     VEGETABLES   Acceptable purchases: Preservative-free bagged lettuce like Fresh Express, peppers, zucchini, eggplant, garlic, leeks, spring onions, shallots, potatoes (fresh), some frozen mashed potatoes, broccoli, asparagus, cauliflower, [place]' sprouts, carrots, corn, chick peas, mushrooms, canned or frozen peas, yams, string beans, artichokes, red beets, some beans, okra, plain rice, turnips and squash.     What to avoid: Onions, sauerkraut, pea pods, broad Italian beans, lima beans, linda beans, navy beans and lentils. Also avoid boxed potato flakes, like instant mashed potatoes.     DRINKS   Acceptable purchases: Naturally decaffeinated coffee or tea, caffeine-free herb tea like chamomile, pear juice, apple juice, grape juice, cranberry juice, apricot nectar, caffeine-free Coke/Pepsi, Diet Rite Cola, Waist Watcher Cola/Diet Rootbeer/Diet Black Cherry, Mug Rootbeer, Hires Rootbeer and A&W Rootbeer. Diet soda using sucralose (Splenda) is not a problem. Vodka is the best tolerated alcoholic beverage. White milk is ok.     What to avoid: Coffee, tea, coffee substitutes, hot chocolate, yesenia, orange soda, lemon lime soda, mountain Dew, any diet soda containing aspartame or saccharin, Barq's Rootbeer, (they add caffeine to it), chocolate milk, wine, champagne, beer, heavy alcoholic drinks.     NUTS/SEEDS/POPCORN   Acceptable purchases: Unflavored popcorn that you pop at home, pumpkins seeds, sunflower seeds without natural flavor, sesame seeds and poppy seeds.    What to avoid: Cheddar cheese popcorn, some microwave popcorn,  all nuts and nut butters, including peanuts. Coconut is out as well as almond extract.     SOY PRODUCTS:   Acceptable purchases: Any soy is questionable, so you might want to avoid it altogether until you have achieved headache control. Then try the following products one at a time: soy milk, soy flour, plain tofu and soy oil.     What to avoid: Soy sauce, miso, tempeh, soy burgers, products containing soy protein isolate or concentrate and soy beans.

## 2024-02-02 NOTE — PROGRESS NOTES
"Subjective   Patient ID: Florecita Redmond is a 38 y.o. female who presents for veritgo (Recurrent vertigo).  HPI  This patient is referred for evaluation of  episodic vertiginous sometimes positional dizziness. The patient is not accompanied by anyone. The approximate duration of her complaints is 8 months.  The patient describes her dizziness as sudden onset of spinning lasting approximately 2 to 3 hours.  She was evaluated in her local ED and released.  She then saw Cathy Duarte for in-home PT and was treated for BPPV.  Vertigo resolved until November.  She again saw Cathy who diagnosed her with left horizontal canal BPPV.  Today, she denies any current positionally triggered vertigo but feels that if she lies on her left side it is \"about to start.\"  She also reports significant unsteadiness fluctuates in severity.  She has days with no dizziness at all.  Dizzy symptoms are exacerbated by closing her eyes in the shower or blinking/stroking lights.  When asked about ear pain, headache, phono-photophobia, visual or motion intolerance, sound or pressure induced symptoms, hearing loss, discharge from ear, tinnitus, aural fullness or autophony, the patient admits to headaches (+ hx of migraine), motion intolerance, visual intolerance.  Patient denies any otologic symptoms.    When asked about a significant past otological history including history of prior ear surgery, noise exposure, exposure to ototoxic drugs or agents, and/or family history of hearing loss, the patient admits to none.  Review of Systems  A comprehensive or 10 points review of the patient´s constitutional, neurological, HEENT, pulmonary, cardiovascular and genito-urinary systems showed only those mentioned in history of present illness.    Objective   Physical Exam  Constitutional: no fever, chills, weight loss or weight gain   General appearance: Appears well, well-nourished, well groomed. No acute distress.   Communication: Normal communication "   Psychiatric: Oriented to person, place and time. Normal mood and affect.   Neurologic: Cranial nerves II-XII grossly intact and symmetric bilaterally.   Head and Face:   Head: Atraumatic with no masses, lesions or scarring.   Face: Normal symmetry, no paralysis, synkinesis or facial tic. No scars or deformities.   TMJ: Bilateral TMJ crepitus  Eyes: Conjunctiva not edematous or erythematous   Ears: External inspection of ears with no deformity, scars or masses.  Right canal with cerumen impaction.  Left canal clear.  TM intact.  No effusion or retraction noted.  Neck: Normal appearing, symmetric, trachea midline.   Cardiovascular: Examination of peripheral vascular system shows no clubbing or cyanosis.   Respiratory: No respiratory distress increased work of breathing. Inspection of the chest with symmetric chest expansion and normal respiratory effort.   Skin: No rashes in the head or neck  Bedside occulomotor function assessment for ocular pursuits and saccades, spontaneous nystagmus,  positional and postural testing (Romberg, Fukuta, bilateral head thrust, and tandem gait) is normal.  [place]-Hallpike deferred at this time.    My interpretation of the audiogram done 1/26/2024 is normal hearing with excellent word recognition scores and normal tympanograms bilaterally.  Assessment/Plan        This patient presents for initial evaluation of acute acquired right-sided cerumen impaction as well as chronic acquired vertigo.    Reassurance given that otologic exam after cleaning and balance testing today are normal.  I recommended further evaluation with VNG/vHIT/VEMP testing.  The physiology of balance control was explained. The likely possible etiologies were reviewed. I believe the patient may have a peripheral vestibular disorder.  I also recommended she pay close attention to any ear symptoms if she has another significant episode of vertigo.  We discussed in detail the symptoms associated with Ménière's disease.   Her episode of vertigo lasting 2 to 3 hours is consistent with Ménière's disease, but she did not endorse any fluctuating hearing loss, tinnitus, fullness and her audiogram today is normal.  Patient was given a handout on dietary modifications for migraine.  I recommended that she eliminate these foods for at least 2 months.  I will contact her after her balance testing is complete.  Patient is in agreement with the plan.  All questions were answered to patient's satisfaction.      30 minutes was spent on this patient´s visit. More than 50% of that time was spent in counseling regarding the possible etiologies, test results, treatment options and coordinating care.    This note was created using speech recognition transcription software. Despite proofreading, several typographical errors might be present that might affect the meaning of the content. Please call with any questions.  Patient ID: Florecita Redmond is a 38 y.o. female.    Ear cerumen removal    Date/Time: 2/2/2024 11:36 AM    Performed by: IQ Vega  Authorized by: QI Vega    Consent:     Consent obtained:  Verbal    Consent given by:  Patient    Risks discussed:  Pain    Alternatives discussed:  No treatment  Procedure details:     Location:  R ear    Procedure type: curette      Procedure outcomes: cerumen removed    Post-procedure details:     Inspection:  No bleeding, ear canal clear and TM intact    Hearing quality:  Normal    Procedure completion:  Tolerated well, no immediate complications      QI Vega 02/02/24 11:29 AM

## 2024-02-02 NOTE — PATIENT INSTRUCTIONS
MIGRAINE / HYPERSENSITIVITY DIET        BREAD  Acceptable purchases: Any white, wheat, rye or pumpernickel store-bought bread. Plain or sesame seed bagels, English muffins, quick breads like pumpernickel or zucchini breads. All yeast bread must be 24 hours old.     What to avoid: Fresh baked bread, either homemade or from the grocer's bakery, fresh donuts, fresh breakfast [place], nut breads, cheese bread, chocolate bread, raisin bread, bagels with dried fruit like blueberry or cranberry bagels. Remember that pizza is fresh bread.     CEREAL   Acceptable purchases: Many cereals are fine. For example: Cheerios, Life, Honey Bunches of Oats, Cracklin' Bran, Frosted Flakes, Frosted Shredded Wheat.     What to avoid: Cereal with nuts, raisins, chocolate, dried fruit, aspartame, peanut butter or coconut.     CRACKERS  Acceptable purchases: Any unflavored cracker such as Saltines, Ritz, Wheat thins, Kee's Table Crackers and Club crackers.     What to avoid: Cheddar cheese crackers, Chick-in-a-bisket, any flavored cracker.     PRETZELS/CHIPS   Acceptable purchases: All plain pretzels and plain potato chips, Tostitos 100% corn chips, Frito's corn chips, Vee's salt and vinegar chips.     What to avoid: Soft pretzels, honey and mustard pretzels, onion and garlic pretzels or other seasoned pretzels. Avoid Pringles, Doritos Eliu chips, jalapeno chips and most other seasoned chips.    PIES/CAKES/COOKIES/CANDY   Acceptable purchases: Blueberry and apple store bought pies if made without lemon juice, vanilla or cinnamon swirl cake, shortbread cookies and vanilla/strawberry wafers, oatmeal cookies without the raisins, rice pudding (no raisins), white chocolate.    What to avoid: Chocolate, chocolate candy, nuts, buttermilk, sour cream, dried fruit (some apricot pies start with dried apricots), peanut butter, lemon extract or lemon juice, almond extract and coconut. Avoid diet and sugar-free products that contain aspartame.      SALAD DRESSING   Acceptable purchases: Any oil and distilled white vinegar. (Homemade ranch is good but you won't find that in the grocery store).     What to avoid: most bottled dressings have one or many of the following; monosodium glutamate, onion or onion powder, grated cheese like Mcgee or parmesan, natural flavoring, red wine vinegar or balsamic vinegar (or anything other than white).     DIPS/SAUCES   Acceptable purchases: buy ingredients to make your own at home.     What to avoid: dips and sauces usually contain MSG (natural flavoring) or onions. Avoid salsa, chips dips, tomato sauce like Ragu, eligio or pesto sauce, gravy, mustard dips, barbeque sauce and guacamole (because of the avocados).     MEAT AND MAIN MEALS   Acceptable purchases: Fresh chicken, beef, veal, lamb, fish, turkey or pork. (Some sausage is made without MSG, natural flavor or onion). Be sure the meat is not injected with a tenderizer (like [employer]'s Simple Tender pork products) or with broth (some turkey and chicken).    What to avoid: Beef liver and chicken liver, marinated meat, ready-made hot wings, barbeque chicken, breaded meat like fried chicken or nuggets or breaded chicken patties, seasoned rotisserie chicken, and any ready-made meal of meat, noodle or rice like burritos, lasagna, Rice-a-Darron and Hamburger [place]. Any canned tuna with broth. Anchovies. Spam. Canned soups have MSG and sometimes onions. Avoid nitrites in ham, hot dogs and most lunchmeats.     DAIRY PRODUCTS   Acceptable purchases: Deli American cheese, American cheese with jalapeno peppers, cottage cheese, ricotta cheese and cream cheese. White milk is ok.    What to avoid: Aged cheeses like Cheddar, [place] Spenser, Heath and Swiss. Avoid mozzarella cheese, Brie, sour cream buttermilk and yogurt. Beware of products made with cheese like pizza and hot pockets. Avoid chocolate milk due to the caffeine.    FRUITS/JUICES   Acceptable purchases: Fresh  strawberries, apples, pears, grapes, peaches, nectarines, blueberries, kiwi, apricots, blackberries, cherries, cantaloupes, mangoes, honeydew melon and watermelon.     What to avoid: Bananas, oranges, grapefruit, efraín, limes, tangerines, pineapples, Clementines, raspberries, plums, papayas, passion fruit, figs, dates, raisins and avocados. Also avoid dried fruits preserved with sulfites.     VEGETABLES   Acceptable purchases: Preservative-free bagged lettuce like Fresh Express, peppers, zucchini, eggplant, garlic, leeks, spring onions, shallots, potatoes (fresh), some frozen mashed potatoes, broccoli, asparagus, cauliflower, [place]' sprouts, carrots, corn, chick peas, mushrooms, canned or frozen peas, yams, string beans, artichokes, red beets, some beans, okra, plain rice, turnips and squash.     What to avoid: Onions, sauerkraut, pea pods, broad Italian beans, lima beans, linda beans, navy beans and lentils. Also avoid boxed potato flakes, like instant mashed potatoes.     DRINKS   Acceptable purchases: Naturally decaffeinated coffee or tea, caffeine-free herb tea like chamomile, pear juice, apple juice, grape juice, cranberry juice, apricot nectar, caffeine-free Coke/Pepsi, Diet Rite Cola, Waist Watcher Cola/Diet Rootbeer/Diet Black Cherry, Mug Rootbeer, Hires Rootbeer and A&W Rootbeer. Diet soda using sucralose (Splenda) is not a problem. Vodka is the best tolerated alcoholic beverage. White milk is ok.     What to avoid: Coffee, tea, coffee substitutes, hot chocolate, yesenia, orange soda, lemon lime soda, mountain Dew, any diet soda containing aspartame or saccharin, Barq's Rootbeer, (they add caffeine to it), chocolate milk, wine, champagne, beer, heavy alcoholic drinks.     NUTS/SEEDS/POPCORN   Acceptable purchases: Unflavored popcorn that you pop at home, pumpkins seeds, sunflower seeds without natural flavor, sesame seeds and poppy seeds.    What to avoid: Cheddar cheese popcorn, some microwave popcorn,  all nuts and nut butters, including peanuts. Coconut is out as well as almond extract.     SOY PRODUCTS:   Acceptable purchases: Any soy is questionable, so you might want to avoid it altogether until you have achieved headache control. Then try the following products one at a time: soy milk, soy flour, plain tofu and soy oil.     What to avoid: Soy sauce, miso, tempeh, soy burgers, products containing soy protein isolate or concentrate and soy beans.

## 2024-02-16 ENCOUNTER — APPOINTMENT (OUTPATIENT)
Dept: OPHTHALMOLOGY | Facility: CLINIC | Age: 39
End: 2024-02-16
Payer: COMMERCIAL
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
## 2024-03-08 ENCOUNTER — CLINICAL SUPPORT (OUTPATIENT)
Dept: AUDIOLOGY | Facility: CLINIC | Age: 39
End: 2024-03-08
Payer: COMMERCIAL

## 2024-03-08 DIAGNOSIS — R42 VERTIGO: Primary | ICD-10-CM

## 2024-03-08 PROCEDURE — 92540 BASIC VESTIBULAR EVALUATION: CPT

## 2024-03-08 PROCEDURE — 92519 VEMP TST I&R CERVICAL&OCULAR: CPT

## 2024-03-08 PROCEDURE — 92537 CALORIC VSTBLR TEST W/REC: CPT

## 2024-03-08 PROCEDURE — 92700 UNLISTED ORL SERVICE/PX: CPT | Mod: 59

## 2024-03-08 NOTE — PATIENT INSTRUCTIONS
BALANCE FUNCTION TEST (BFT)  AFTER VISIT SUMMARY      TESTING SUMMARY     The purpose of today's testing was to evaluate for any vestibular system (inner ear) involvement to account for your symptoms of dizziness/imbalance. Deep inside each of your ears, there are 5 balance organs which contribute to your ability to maintain balance and reduce dizziness. Our vestibular system involves 3 semicircular canals (“spinning detectors”) and 2 otolith organs (“gravity sensors”).    IMPRESSIONS     Based on today's evaluation, your vestibular system appears to be weakened on the right and possibly contributing as a source for your symptoms. Additional evidence of right posterior canal BPPV.    RECOMMENDATIONS     Continue medical follow up with Miroslava Sloan CNP.   Consider further vestibular physical therapy to address vestibular loss.   Consider re-evaluation as medically indicated.  Maintain a healthy lifestyle to help body function overall.    Testing and interpretation of results completed by Tashi Oglesby CCC-A CCVR. It was my pleasure to evaluate this patient.       Tashi Oglesby, CCC-A CCVR  Senior Clinical Vestibular Audiologist

## 2024-03-15 DIAGNOSIS — H83.2X1 VESTIBULAR HYPOFUNCTION OF RIGHT EAR: ICD-10-CM

## 2024-03-15 DIAGNOSIS — R42 VERTIGO: Primary | ICD-10-CM

## 2024-03-15 DIAGNOSIS — H81.11 BENIGN PAROXYSMAL POSITIONAL VERTIGO OF RIGHT EAR: ICD-10-CM

## 2024-04-05 ENCOUNTER — TELEMEDICINE (OUTPATIENT)
Dept: BEHAVIORAL HEALTH | Facility: CLINIC | Age: 39
End: 2024-04-05
Payer: COMMERCIAL

## 2024-04-05 DIAGNOSIS — F33.42 RECURRENT MAJOR DEPRESSIVE DISORDER, IN FULL REMISSION (CMS-HCC): Primary | ICD-10-CM

## 2024-04-05 DIAGNOSIS — F41.9 ANXIETY AND DEPRESSION: ICD-10-CM

## 2024-04-05 DIAGNOSIS — F32.A ANXIETY AND DEPRESSION: ICD-10-CM

## 2024-04-05 DIAGNOSIS — F51.01 PRIMARY INSOMNIA: ICD-10-CM

## 2024-04-05 PROCEDURE — 99213 OFFICE O/P EST LOW 20 MIN: CPT

## 2024-04-05 PROCEDURE — 3008F BODY MASS INDEX DOCD: CPT

## 2024-04-05 RX ORDER — TRAZODONE HYDROCHLORIDE 100 MG/1
100 TABLET ORAL NIGHTLY PRN
Qty: 90 TABLET | Refills: 0 | Status: SHIPPED | OUTPATIENT
Start: 2024-04-05

## 2024-04-05 RX ORDER — VENLAFAXINE HYDROCHLORIDE 150 MG/1
150 CAPSULE, EXTENDED RELEASE ORAL
Qty: 90 CAPSULE | Refills: 0 | Status: SHIPPED | OUTPATIENT
Start: 2024-04-05

## 2024-04-05 RX ORDER — GABAPENTIN 300 MG/1
300 CAPSULE ORAL NIGHTLY
Qty: 90 CAPSULE | Refills: 0 | Status: SHIPPED | OUTPATIENT
Start: 2024-04-05

## 2024-04-05 NOTE — PROGRESS NOTES
Outpatient Psychiatry- Follow up visit    Subjective   Florecita Redmond, a 38 y.o. female, presenting for follow up visit for   Chief Complaint   Patient presents with    Anxiety    MDD (Major Depressive Disorder)    Insomnia      HPI:  Florecita states she is doing well overall; has been a bit stressful with her family. Her  is struggling with MH issues, which is new for him. He has connected with a therapist so far.     Florecita's mood is good, denies depressive sx, denies SI/HI.     Anxiety is a bit elevated due to work, but manageable. Wants to be exercising more, hard to get there. She is an all or nothing type; if she does not have time to do a full workout, she will not do it.     Has increased diaphoresis which is typical for her.    Florecita is not getting enough sleep, harder to fall asleep when anxiety is increased. Is more of a night owl. Getting 6.5-7.5 hrs of sleep a night. Will not attempt to go to bed until she gets sleepy.     Pr previous HPI:  Florecita states things are going well, holidays were nice, went to [place] for Thanksgiving and [place].     NOTE: Symptom scale is rated where 0 = no symptoms at all, and 10 = symptoms so severe that pt is an imminent danger to themselves or others and requires hospitalization.    Anxiety and Depression remains present less days than not, which has improved over the past few weeks. Florecita Redmond rates the severity of depression symptoms as a 0/10, anxiety at 4/10.    Mood is good, denies depressive sx, denies SI/HI.  Using light therapy. Managing seasonal depression well.    Anxiety is manageable; some work related anxiety. 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    Sleep is good, getting 7-8hrs do sleep a night. Denies problems falling or staying asleep.     Psychiatric Review Of Systems:  Depressive Symptoms: negative  Manic Symptoms: negative  Anxiety Symptoms: General Anxiety Disorder (JOSE)JOSE Behaviors: manageable  Psychotic  Symptoms: negative  Trauma Symptoms: None  Other Symptoms/Concerns: insomnia  Delirium/Altered Mental Status Symptoms:Other: (comment) WNL    Current Medications:    Current Outpatient Medications:     fluticasone (Flonase) 50 mcg/actuation nasal spray, Administer 1 spray into each nostril once daily. Shake gently. Before first use, prime pump. After use, clean tip and replace cap., Disp: 16 g, Rfl: 3    gabapentin (Neurontin) 300 mg capsule, Take 1 capsule (300 mg) by mouth once daily at bedtime., Disp: 90 capsule, Rfl: 0    SUMAtriptan (Imitrex) 50 mg tablet, Take 1 tablet (50 mg) by mouth 1 time if needed for migraine. May repeat after 2 hours., Disp: 27 tablet, Rfl: 3    traZODone (Desyrel) 100 mg tablet, Take 1 tablet (100 mg) by mouth as needed at bedtime for sleep., Disp: 90 tablet, Rfl: 0    venlafaxine XR (Effexor-XR) 150 mg 24 hr capsule, Take 1 capsule (150 mg) by mouth once daily in the morning. Take before meals., Disp: 90 capsule, Rfl: 0    Medical History:  Past Medical History:   Diagnosis Date    Allergic     Anxiety 2003    Depression 2003    GERD (gastroesophageal reflux disease) 2007    Headache 2010    Personal history of other complications of pregnancy, childbirth and the puerperium 07/29/2021    History of postpartum depression       Past Psychiatric History:   Onset History: Depression/anxiety at 16 yo.   Inpatient history: 2022 [employer].   Suicide attempts/Self-Harm/Ideation History: SI in 2022.   Past providers: Lisa Al NP, Dr Dick De Los Santos, [facility].   Past medication trials: Viibryd- made depression worse, Lexapro- not effective, Latuda- 1st time helped, 2nd time not effective, bupropion  mg possible tinnitus, buspirone 5 mg BID, trazodone 50 mg, hydroxyzine 50 mg.       Family Psychiatric History  Maternal Grandmother    · Family history of depression      Social History:   Upbringing: Born and raised in [place]. One older sister. Parents , overall  "childhood was loving and supportive.   Trauma: Denies hx of abuse  Education: Doctorate in Veterinary Medicine  Work:  Oncologist   Marital Status:   Children: 2 children. boy 5 yo, 18 mo girl  Living situation: Lived with  and children  : Denies  Legal: Denies  Access to Weapons: No firearms in household  Guardian/POA/Payee:  self    Substance Use History:  Tobacco use: denies  Use of alcohol: denied  Use of caffeine: caffeinated soft drinks 2 /day  Use of other substances: denies  Legal consequences of substance use: denies  Substance use disorder treatment: n/a    Record Review: brief     Medical Review Of Systems:  A comprehensive review of systems was negative.  Started sumatriptan for migraines, has been helpful    MEDICAL HISTORY  -PCP: HALLE Greene-CNP  -Pt reports currently is not pregnant, and currently is sexually active, had a tubal. LMP: 3/5/24    -TBI/head trauma/LOC/seizure hx: denies      Objective   Mental Status Exam  Appearance: Neat and clean in appearance, dressed appropriately.   Attitude: Calm, cooperative, and engaged in conversation.  Behavior: Appropriate eye contact.   Motor Activity: No psychomotor agitation or retardation. No abnormal movements, tremors or tics. No evidence of extrapyramidal symptoms or tardive dyskinesia.  Speech: Regular rate, rhythm, volume. Spontaneous, no pressured speech.  Mood: \"good\"  Affect: Euthymic, full range, mood congruent.  Thought Process: Linear, logical, and goal-directed. No loose associations or gross thought disorganization.  Thought Content: Denied current suicidal ideation or thoughts of harm to self, denied homicidal ideation or thoughts of harm to others. No delusional thinking elicited. No perseverations or obsessions identified.   Perception: Did not endorse auditory or visual hallucinations, did not appear to be responding to hallucinatory stimuli.   Cognition: Alert, oriented x3. Preserved attention " span and concentration, recent and remote memory. Adequate fund of knowledge. No deficits in language.   Insight: Good, in regards to understanding mental health condition  Judgement: Intact      Vitals:  There were no vitals filed for this visit.    Risk Assessment:  SI/HI ASSESSMENT  -Risk Assessment: Florecita Redmond is currently a low acute risk of suicide and self-harm due to no past suicide attempt(s) and not currently endorsing thoughts of suicide. Florecita Redmond is currently a low acute risk of violence and harm to others due to no past history of violence and not currently threatening others.  -Suicidal Risk Factors:   -Violence Risk Factors: none  -Protective Factors: sense of responsibility towards family, social support/connectedness, child related concerns/living with child <18 years, positive family relationships, hopefulness/future orientation, marriage/partnership, and employment  -Plan to Reduce Risk: Establish medication regimen and outpatient follow-up care    Florecita was seen today for anxiety, mdd (major depressive disorder) and insomnia.  Diagnoses and all orders for this visit:  Recurrent major depressive disorder, in full remission (CMS/HCC) (Primary)  -     venlafaxine XR (Effexor-XR) 150 mg 24 hr capsule; Take 1 capsule (150 mg) by mouth once daily in the morning. Take before meals.  Primary insomnia  -     traZODone (Desyrel) 100 mg tablet; Take 1 tablet (100 mg) by mouth as needed at bedtime for sleep.  -     gabapentin (Neurontin) 300 mg capsule; Take 1 capsule (300 mg) by mouth once daily at bedtime.  Anxiety and depression    Impression:  Florecita continues to do well; depression well managed. Anxiety a bit elevated due to circumstantial stressors.  Discussed treatment plan, including increasing activity to help manage anxiety. Will continue current medication regimen and follow up in three months.     Plan/Recommendations:  Medications:    -Continue gabapentin 300mg at bedtime for  anxiety   -Continue trazodone 100 mg at bedtime as needed for sleep   - Continue venlafaxine  mg daily for depression and anxiety  Follow up: July 12th 0930  Call  Psychiatry at [phone] with issues.  For [facility] residents, [employer] is a 24/7 hotline you can call for assistance [435.109.7189]. Please call [phone] or go to your closest Emergency Room if you feel unsafe. This includes thoughts of hurting yourself or anyone else, or having other troubles such as hearing voices, seeing visions, or having new and scary thoughts about the people around you.    Review with patient: Treatment plan reviewed with the patient.    Time Spent:  Prep time: 1 min  Direct patient time: 18 min  Documentation time: 8 min  Total time: 27 min    HALLE Koenig-CNP

## 2024-04-07 NOTE — PATIENT INSTRUCTIONS
Plan/Recommendations:  Medications:    -Continue gabapentin 300mg at bedtime for anxiety   -Continue trazodone 100 mg at bedtime as needed for sleep   - Continue venlafaxine  mg daily for depression and anxiety  Follow up: July 12th 0930  Call  Psychiatry at [phone] with issues.  For [facility] residents, [employer] is a 24/7 hotline you can call for assistance [280.402.8946]. Please call 234/680 or go to your closest Emergency Room if you feel unsafe. This includes thoughts of hurting yourself or anyone else, or having other troubles such as hearing voices, seeing visions, or having new and scary thoughts about the people around you.

## 2024-05-06 ENCOUNTER — OFFICE VISIT (OUTPATIENT)
Dept: PRIMARY CARE | Facility: CLINIC | Age: 39
End: 2024-05-06
Payer: COMMERCIAL

## 2024-05-06 VITALS
HEART RATE: 73 BPM | WEIGHT: 226.6 LBS | SYSTOLIC BLOOD PRESSURE: 128 MMHG | BODY MASS INDEX: 36.42 KG/M2 | DIASTOLIC BLOOD PRESSURE: 84 MMHG | OXYGEN SATURATION: 97 % | HEIGHT: 66 IN

## 2024-05-06 DIAGNOSIS — L98.9 SKIN LESION OF LOWER EXTREMITY: Primary | ICD-10-CM

## 2024-05-06 DIAGNOSIS — R94.6 ABNORMAL THYROID FUNCTION TEST: ICD-10-CM

## 2024-05-06 PROCEDURE — 99213 OFFICE O/P EST LOW 20 MIN: CPT | Performed by: NURSE PRACTITIONER

## 2024-05-06 PROCEDURE — 3008F BODY MASS INDEX DOCD: CPT | Performed by: NURSE PRACTITIONER

## 2024-05-06 PROCEDURE — 1036F TOBACCO NON-USER: CPT | Performed by: NURSE PRACTITIONER

## 2024-05-06 ASSESSMENT — PATIENT HEALTH QUESTIONNAIRE - PHQ9
2. FEELING DOWN, DEPRESSED OR HOPELESS: NOT AT ALL
1. LITTLE INTEREST OR PLEASURE IN DOING THINGS: NOT AT ALL
SUM OF ALL RESPONSES TO PHQ9 QUESTIONS 1 AND 2: 0

## 2024-05-06 NOTE — PROGRESS NOTES
"Subjective   Patient ID: Florecita Redmond is a 38 y.o. female who presents for Mass    Presents for over one month skin lesion in groin (L)  Is growing in size  Is not painful, no drainage.  Is tingling on occasion  Denies any diarrhea, constipation, urinary symptoms.    Due for follow up thyroid testing - needs order placed      Review of Systems    Objective     /84 (BP Location: Right arm, Patient Position: Sitting, BP Cuff Size: Adult)   Pulse 73   Ht 1.676 m (5' 6\")   Wt 103 kg (226 lb 9.6 oz)   SpO2 97%   BMI 36.57 kg/m²      Physical Exam  Vitals and nursing note reviewed.   Constitutional:       General: She is not in acute distress.     Appearance: Normal appearance.   Lymphadenopathy:      Lower Body: No right inguinal adenopathy. No left inguinal adenopathy.   Skin:     General: Skin is warm.      Findings: Lesion (left upper medial thigh subcutaneous mobile ~1cm cyst) present.      Comments: No drainage, non tender         Assessment/Plan   Diagnoses and all orders for this visit:  Skin lesion of lower extremity  Abnormal thyroid function test  -     Thyroid Peroxidase (TPO) Antibody; Future  -     TSH; Future  -     T4, free; Future      Patient Instructions   Reassured subcutaneous cyst, likely \"hair bump\" or sebaceous cyst  Advised to use moist heat to area 15-20 minutes at a time  May take a few weeks to resolve  Follow up with any changing or persisting symptoms   "

## 2024-05-06 NOTE — PATIENT INSTRUCTIONS
"Reassured subcutaneous cyst, likely \"hair bump\" or sebaceous cyst  Advised to use moist heat to area 15-20 minutes at a time  May take a few weeks to resolve  Follow up with any changing or persisting symptoms  "

## 2024-05-09 NOTE — PROGRESS NOTES
Physical Therapy    Physical Therapy Evaluation and Treatment      Patient Name: Florecita Redmond  MRN: [id number]  Today's Date: 5/10/2024     PT Evaluation Time Entry  PT Evaluation (Low) Time Entry: 30  PT Therapeutic Procedures Time Entry  Neuromuscular Re-Education Time Entry: 20  PT Modalities Time Entry  Canalith Repositioning Time Entry: 10    Visit number:  1  Insurance:  [employer]  Authorized visits:  20  Authorization end date:  12/31/2024  Primary diagnosis:  Vertigo    Assessment:      Patient has completed vestibular rehabilitation with another therapy and at this time does not have significant deficits.  Patient with sensation of imbalance with rolling to left in bed, quick turns 180*.  Patient with decreased symptoms with education to use grounding to decreased postural sway and symptoms of dizziness.  Patient was without dizziness or nystagmus indicating that the patient was BPPV.  Patient without knowledge of how to use maintenances maneuvers to decrease risk of BPPV.  Patient can benefit from skilled physical therapy to address her symptoms and educate how to reduce reoccurrence of BPPV.    Plan:  OP PT Plan  Treatment/Interventions: Canalith repositioning, Education/ Instruction, Neuromuscular re-education, Therapeutic activities, Therapeutic exercises  PT Plan: Skilled PT  PT Frequency: 1 time per week  Duration: 12    Current Problem:   1. Vertigo  Referral to Physical Therapy      2. Vestibular hypofunction of right ear  Referral to Physical Therapy      3. Benign paroxysmal positional vertigo of left ear  Referral to Physical Therapy          Subjective    Dizziness with low light environment, rollubg ti left,stooping.  General:     PER LOTUS AGUILERA NOTE DATED 3/8/2024:  Bithermal caloric testing was abnormal. Unilateral weakness of 34% to the right which is abnormal and a directional preponderance of 14% to the left which is normal. Caloric testing is to evaluate for peripheral vestibular  "lesion.     Diana-Hallpike testing was abnormal given up-beating torsional nystagmus to the right in the head right position. Patient reported dizziness in this position. Nystagmus decayed over time. Indicative of right posterior canal BPPV. Diana-Hallpike testing is to provide a diagnosis of Benign Paroxysmal Positional Vertigo (BPPV) of the vertical semicircular canals on the side which is most affected.    PER JENNIFER MEANS NOTE DATED 2/2/2024  Florecita Redmond is a 38 y.o. female who presents for veritgo (Recurrent vertigo).  HPI  This patient is referred for evaluation of  episodic vertiginous sometimes positional dizziness. The patient is not accompanied by anyone. The approximate duration of her complaints is 8 months.  The patient describes her dizziness as sudden onset of spinning lasting approximately 2 to 3 hours.  She was evaluated in her local ED and released.  She then saw Cathy Duarte for in-home PT and was treated for BPPV.  Vertigo resolved until November.  She again saw Cathy who diagnosed her with left horizontal canal BPPV.  Today, she denies any current positionally triggered vertigo but feels that if she lies on her left side it is \"about to start.\"  She also reports significant unsteadiness fluctuates in severity.  She has days with no dizziness at all.  Dizzy symptoms are exacerbated by closing her eyes in the shower or blinking/stroking lights.  When asked about ear pain, headache, phono-photophobia, visual or motion intolerance, sound or pressure induced symptoms, hearing loss, discharge from ear, tinnitus, aural fullness or autophony, the patient admits to headaches (+ hx of migraine), motion intolerance, visual intolerance.  Patient denies any otologic symptoms.     When asked about a significant past otological history including history of prior ear surgery, noise exposure, exposure to ototoxic drugs or agents, and/or family history of hearing loss, the patient admits to none.  Precautions:   "   Vital Signs:     Pain:     Home Living:     Prior Level of Function:       Objective   CERVICAL ROM:    AROM Dizziness   EXTENSION 55    FLEXION 45    RIGHT ROTATION 60    LEFT ROTATION 50    RIGHT LATERAL FLEXION 30    LEFT LATERAL FLEXION 40      CERVICAL SPECIAL TEST:  TRANSVERSE LIGAMENT  NEG   SHARP-JOANIE NEG    VERTEBRAL ARTERY SUBJECTIVE REPORTING FOR THE FOLLOWING:     NEGATIVE POSITIVE   DYPLOPIA X    DYSARTHRIA X    DYSPHAGIA X    NYSTAGMUS X    DROP ATTACK X    NAUSEA X    FACIAL NUMBNESS X    DIZZINESS X      OCULAR MOTOR EXAM:   NORMAL ABNORMAL   RANGE OF MOTION X    SMOOTH PURSUIT  X    HORIZONTAL SACCADES X    VERTICAL SACCADES X    EYE ALIGNMENT X    COVER X    UNCOVER X    CROSS COVER X    CONVERGENCE X        ALL OF THE BELOW WITH GOGGLES UNLESS OTHERWISE NOTED:  SPONTANEOUS NEG   GAZE EVOKED NEG     POSITIONAL TESTING:  POSITION NYSTAGMUS DIRECTION DIZZINESS DURATION(SECONDS)   HEAD CENTER  0/10    ROLL RIGHT  0/10    ROLL LEFT  0/10    TE HALPI[place] RIGHT  0/10    TE HALPI[place] LEFT LEFT TORSIONAL NYSTAGMUS 1/10      Outcome Measures:  Other Measures  Dizziness Handicap Inventory: 16     Treatments:  Semont for Left posterior canal BPPV, 2 times.    Patient education on maintenance CRM to decrease risk of accumulation of crystals in semi circular canals.        EDUCATION:       Goals:  Active       PT Problem       PT Goal 1:  Patient reports a 50% improvement of symptoms with rolling onto left side in bed        Start:  05/12/24    Expected End:  12/31/24            PT Goal 2:  Patient increase right lateral flexion to 35 degrees to allow increased ease in looking under objects       Start:  05/12/24    Expected End:  12/31/24            PT Goal 3:  Patient without dizziness in left posterior canal test position       Start:  05/12/24    Expected End:  12/31/24            PT Goal 4:  Patient independent in identifying which side her vertigo is on and how to perform Self management of BPPV        Start:  05/12/24    Expected End:  12/31/24            Patient Stated Goal 1:  To manage vestibular hypofunction       Start:  05/12/24    Expected End:  12/31/24

## 2024-05-10 ENCOUNTER — CLINICAL SUPPORT (OUTPATIENT)
Dept: PHYSICAL THERAPY | Facility: CLINIC | Age: 39
End: 2024-05-10
Payer: COMMERCIAL

## 2024-05-10 DIAGNOSIS — R42 VERTIGO: Primary | ICD-10-CM

## 2024-05-10 DIAGNOSIS — H81.12 BENIGN PAROXYSMAL POSITIONAL VERTIGO OF LEFT EAR: ICD-10-CM

## 2024-05-10 DIAGNOSIS — H83.2X1 VESTIBULAR HYPOFUNCTION OF RIGHT EAR: ICD-10-CM

## 2024-05-10 PROCEDURE — 97112 NEUROMUSCULAR REEDUCATION: CPT | Mod: GP | Performed by: PHYSICAL THERAPIST

## 2024-05-10 PROCEDURE — 97161 PT EVAL LOW COMPLEX 20 MIN: CPT | Mod: GP | Performed by: PHYSICAL THERAPIST

## 2024-05-10 PROCEDURE — 95992 CANALITH REPOSITIONING PROC: CPT | Mod: GP | Performed by: PHYSICAL THERAPIST

## 2024-05-10 ASSESSMENT — PAIN - FUNCTIONAL ASSESSMENT: PAIN_FUNCTIONAL_ASSESSMENT: 0-10

## 2024-05-10 ASSESSMENT — ENCOUNTER SYMPTOMS
LOSS OF SENSATION IN FEET: 0
DEPRESSION: 0
OCCASIONAL FEELINGS OF UNSTEADINESS: 1

## 2024-05-24 ENCOUNTER — APPOINTMENT (OUTPATIENT)
Dept: PHYSICAL THERAPY | Facility: CLINIC | Age: 39
End: 2024-05-24
Payer: COMMERCIAL

## 2024-05-25 ENCOUNTER — APPOINTMENT (OUTPATIENT)
Dept: DERMATOLOGY | Facility: CLINIC | Age: 39
End: 2024-05-25
Payer: COMMERCIAL

## 2024-07-05 ENCOUNTER — DOCUMENTATION (OUTPATIENT)
Dept: PHYSICAL THERAPY | Facility: CLINIC | Age: 39
End: 2024-07-05
Payer: COMMERCIAL

## 2024-07-05 NOTE — PROGRESS NOTES
Physical Therapy    Discharge Summary    Name: Florecita Redmond  MRN: [id number]  : 1985  Date: 24    Discharge Summary: PT    Discharge Information: Date of evaluation 5/10/2024        Rehab Discharge Reason: Other Patient seen for evaluation and has not set up further appointments.  Patient will be discharged at this time.

## 2024-07-09 ENCOUNTER — APPOINTMENT (OUTPATIENT)
Dept: ORTHOPEDIC SURGERY | Facility: HOSPITAL | Age: 39
End: 2024-07-09
Payer: COMMERCIAL

## 2024-07-09 ENCOUNTER — OFFICE VISIT (OUTPATIENT)
Dept: ORTHOPEDIC SURGERY | Facility: HOSPITAL | Age: 39
End: 2024-07-09
Payer: COMMERCIAL

## 2024-07-09 ENCOUNTER — HOSPITAL ENCOUNTER (OUTPATIENT)
Dept: RADIOLOGY | Facility: HOSPITAL | Age: 39
Discharge: HOME | End: 2024-07-09
Payer: COMMERCIAL

## 2024-07-09 VITALS — WEIGHT: 220 LBS | HEIGHT: 66 IN | BODY MASS INDEX: 35.36 KG/M2

## 2024-07-09 DIAGNOSIS — S93.401A MODERATE RIGHT ANKLE SPRAIN, INITIAL ENCOUNTER: Primary | ICD-10-CM

## 2024-07-09 DIAGNOSIS — M25.571 ACUTE RIGHT ANKLE PAIN: ICD-10-CM

## 2024-07-09 PROCEDURE — L4361 PNEUMA/VAC WALK BOOT PRE OTS: HCPCS | Performed by: STUDENT IN AN ORGANIZED HEALTH CARE EDUCATION/TRAINING PROGRAM

## 2024-07-09 PROCEDURE — 99213 OFFICE O/P EST LOW 20 MIN: CPT | Performed by: STUDENT IN AN ORGANIZED HEALTH CARE EDUCATION/TRAINING PROGRAM

## 2024-07-09 PROCEDURE — 3008F BODY MASS INDEX DOCD: CPT | Performed by: STUDENT IN AN ORGANIZED HEALTH CARE EDUCATION/TRAINING PROGRAM

## 2024-07-09 PROCEDURE — 73610 X-RAY EXAM OF ANKLE: CPT | Mod: RIGHT SIDE | Performed by: RADIOLOGY

## 2024-07-09 PROCEDURE — 73610 X-RAY EXAM OF ANKLE: CPT | Mod: RT

## 2024-07-09 PROCEDURE — 99203 OFFICE O/P NEW LOW 30 MIN: CPT | Performed by: STUDENT IN AN ORGANIZED HEALTH CARE EDUCATION/TRAINING PROGRAM

## 2024-07-09 PROCEDURE — 1036F TOBACCO NON-USER: CPT | Performed by: STUDENT IN AN ORGANIZED HEALTH CARE EDUCATION/TRAINING PROGRAM

## 2024-07-09 ASSESSMENT — PAIN SCALES - GENERAL: PAINLEVEL_OUTOF10: 4

## 2024-07-09 ASSESSMENT — PAIN DESCRIPTION - DESCRIPTORS: DESCRIPTORS: ACHING;THROBBING;SHOOTING;SHARP

## 2024-07-09 ASSESSMENT — PAIN - FUNCTIONAL ASSESSMENT: PAIN_FUNCTIONAL_ASSESSMENT: 0-10

## 2024-07-09 NOTE — PROGRESS NOTES
REFERRAL SOURCE: No ref. provider found     CHIEF COMPLAINT: right ankle pain  - orthopedic injury clinic evaluation    HISTORY OF PRESENT ILLNESS  Florecita Redmond is a very pleasant 39 y.o. female  with history of depression (in remission), plantar fascitis, obesity with BMI of 35 who is here for evaluation of right ankle pain.     7/9/24: She complains of right ankle pain. On July 4th, she had an inversion injury and felt a pop. She previously sprained her ankle and now she has radiation of pain. The pain is laterally and radiate proximally and to the heel. Pain is intermediate and fluctuates. Pain is achy and 4/10 at worst. She can bear weight but it is painful. Pain improves with ice. NSAIDs do not help. Pain is worse in the evening and pain is worse with weight bearing. She is taking gabapentin 300 mg at night.    MEDS    Current Outpatient Medications:     fluticasone (Flonase) 50 mcg/actuation nasal spray, Administer 1 spray into each nostril once daily. Shake gently. Before first use, prime pump. After use, clean tip and replace cap., Disp: 16 g, Rfl: 3    gabapentin (Neurontin) 300 mg capsule, Take 1 capsule (300 mg) by mouth once daily at bedtime., Disp: 90 capsule, Rfl: 0    SUMAtriptan (Imitrex) 50 mg tablet, Take 1 tablet (50 mg) by mouth 1 time if needed for migraine. May repeat after 2 hours., Disp: 27 tablet, Rfl: 3    traZODone (Desyrel) 100 mg tablet, Take 1 tablet (100 mg) by mouth as needed at bedtime for sleep., Disp: 90 tablet, Rfl: 0    venlafaxine XR (Effexor-XR) 150 mg 24 hr capsule, Take 1 capsule (150 mg) by mouth once daily in the morning. Take before meals., Disp: 90 capsule, Rfl: 0    ALLERGIES  Allergies   Allergen Reactions    Prochlorperazine Anxiety       PAST MEDICAL HISTORY  Past Medical History:   Diagnosis Date    Allergic     Anxiety 2003    Depression 2003    GERD (gastroesophageal reflux disease) 2007    Headache 2010    Personal history of other complications of  pregnancy, childbirth and the puerperium 2021    History of postpartum depression       PAST SURGICAL HISTORY  Past Surgical History:   Procedure Laterality Date     SECTION, LOW TRANSVERSE  2021    OTHER SURGICAL HISTORY  2021    Bilateral salpingectomy    OTHER SURGICAL HISTORY  2021     section low transverse    TUBAL LIGATION      WISDOM TOOTH EXTRACTION  2002       SOCIAL HISTORY   Social History     Socioeconomic History    Marital status: Unknown     Spouse name: Not on file    Number of children: Not on file    Years of education: Not on file    Highest education level: Not on file   Occupational History    Not on file   Tobacco Use    Smoking status: Never    Smokeless tobacco: Never   Vaping Use    Vaping status: Never Used   Substance and Sexual Activity    Alcohol use: Not Currently    Drug use: Never    Sexual activity: Yes     Partners: Male     Birth control/protection: Female Sterilization   Other Topics Concern    Not on file   Social History Narrative    Not on file     Social Determinants of Health     Financial Resource Strain: Not on file   Food Insecurity: Not on file   Transportation Needs: Not on file   Physical Activity: Not on file   Stress: Not on file   Social Connections: Not on file   Intimate Partner Violence: Not on file   Housing Stability: Not on file       FAMILY HISTORY  Family History   Problem Relation Name Age of Onset    Other (cardiac disorder) Father Keanu Redmond     Glaucoma Father Keanu Redmond     Hypertension Father Keanu Redmond     Prostate cancer Father eKanu Redmond     Cancer Father Keanu Redmond     Heart disease Father Keanu Redmond     Depression Maternal Grandmother Cristel     Heart disease Mother Goldie Redmond        REVIEW OF SYSTEMS  Except for those mentioned in the history of present illness, and below, a complete review of systems is negative.     Review of Systems     VITALS  There were no vitals filed for this  visit.    PHYSICAL EXAMINATION   GENERAL:  Awake, alert, and oriented, no apparent distress, pleasant, and cooperative  PSYC: Mood is euthymic, affect is congruent  EAR, NOSE, THROAT:  Normocephalic, atraumatic, moist membranes, anicteric sclera  LUNG: Nonlabored breathing  HEART: No clubbing or cyanosis  SKIN: No increased erythema, warmth, rashes, or concerning skin lesions  NEURO: Sensation is intact in the bilateral lower extremities. Strength is grossly 5 out of 5 throughout the bilateral lower extremities, unless noted below.  GAIT: Non-antalgic  MUSCULOSKELETAL: Examination of the right ankle: Ankle range of motion is full and painful at end range of motion.  Swelling over the lateral ankle region.  Tenderness palpation over the ATFL and peroneal tendons.  Positive anterior drawer.  Negative talar tilt.      IMAGING STUDIES:   Radiographs the right ankle dated 7/9/2024 were personally reviewed and interpreted by me, Dr. Ruthann Pina, and the findings shared with the patient.  No evidence of fracture or medial clear space widening.  Soft tissue swelling laterally.     IMPRESSION  #1 Right ankle sprain    PLAN  The following was discussed with the patient:     Florecita Redmond is a very pleasant 39 y.o. female  with history of depression (in remission), plantar fascitis, obesity with BMI of 35 who is here for evaluation of right ankle pain due to right ankle sprain.  -We discussed the diagnosis.  -We discussed that physical therapy is the mainstay of treatment and she was given a referral today.  -Given that she is up on her feet all day at work, she would benefit from immobilization in a walking boot for 1 week.  She was instructed to come out of the boot 3-5 times a day and do range of motion exercises.  -After weaning out of the boot, she should utilize her lace up ASO ankle brace.  -Follow-up in 6 weeks, or sooner if needed.    The patient was counseled to remain active, but avoid activities  that worsen symptoms. The patient was in agreement with this plan. All questions were answered to the best of my ability.    PATIENT EDUCATION:  Education was discussed at today's appointment. A learning needs assessment was performed.    Primary learner: Florecita Redmond  Barriers to learning: None  Preferred language: English  Learning preferences include: Seeing and doing.  Discussed: Diagnosis and treatment plan.  Demonstrated: Understanding of material discussed.  Patient education materials given: None.  Learner response: Learner demonstrated understanding.    This note was dictated using Dragon speech recognition software and was not corrected for spelling or grammatical errors.

## 2024-07-12 ENCOUNTER — APPOINTMENT (OUTPATIENT)
Dept: BEHAVIORAL HEALTH | Facility: CLINIC | Age: 39
End: 2024-07-12
Payer: COMMERCIAL

## 2024-07-12 DIAGNOSIS — F33.42 RECURRENT MAJOR DEPRESSIVE DISORDER, IN FULL REMISSION (CMS-HCC): ICD-10-CM

## 2024-07-12 DIAGNOSIS — F32.A ANXIETY AND DEPRESSION: ICD-10-CM

## 2024-07-12 DIAGNOSIS — F41.9 ANXIETY AND DEPRESSION: ICD-10-CM

## 2024-07-12 DIAGNOSIS — F51.01 PRIMARY INSOMNIA: ICD-10-CM

## 2024-07-12 PROCEDURE — 99213 OFFICE O/P EST LOW 20 MIN: CPT

## 2024-07-12 PROCEDURE — 3008F BODY MASS INDEX DOCD: CPT

## 2024-07-12 RX ORDER — TRAZODONE HYDROCHLORIDE 100 MG/1
100 TABLET ORAL NIGHTLY PRN
Qty: 90 TABLET | Refills: 0 | Status: SHIPPED | OUTPATIENT
Start: 2024-07-12

## 2024-07-12 RX ORDER — VENLAFAXINE HYDROCHLORIDE 150 MG/1
150 CAPSULE, EXTENDED RELEASE ORAL
Qty: 90 CAPSULE | Refills: 0 | Status: SHIPPED | OUTPATIENT
Start: 2024-07-12

## 2024-07-12 RX ORDER — GABAPENTIN 300 MG/1
300 CAPSULE ORAL NIGHTLY
Qty: 90 CAPSULE | Refills: 0 | Status: SHIPPED | OUTPATIENT
Start: 2024-07-12 | End: 2024-07-12 | Stop reason: SDUPTHER

## 2024-07-12 RX ORDER — GABAPENTIN 300 MG/1
300 CAPSULE ORAL NIGHTLY
Qty: 90 CAPSULE | Refills: 1 | Status: SHIPPED | OUTPATIENT
Start: 2024-07-12

## 2024-07-12 NOTE — PROGRESS NOTES
Outpatient Psychiatry- Follow up visit    Subjective   Florecita Redmond, a 39 y.o. female, presenting for follow up visit for   Chief Complaint   Patient presents with    MDD (Major Depressive Disorder)    Anxiety    Insomnia      Virtual or Telephone Consent    An interactive audio and video telecommunication system which permits real time communications between the patient (at the originating site) and provider (at the distant site) was utilized to provide this telehealth service.   Verbal consent was requested and obtained from Florecita Redmond on this date, 07/14/24 for a telehealth visit.       HPI:  Florecita states she is doing well; hurt her foot and having difficulty walking. Rolled her ankle at exercise class.     Mood is good, denies depressive sx. Denies SI/HI and passive thoughts of death.     Anxiety is at baseline, mostly work related.     NOTE: Symptom scale is rated where 0 = no symptoms at all, and 10 = symptoms so severe that pt is an imminent danger to themselves or others and requires hospitalization.    Anxiety remains present more days than not, which has remained unchanged over the past few weeks. Rates the severity of psych symptoms as a 3/10, noting symptom improvement with removing herself from situation, laugh, listen to music and worsening of symptoms with work stress and family stress.    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 Does not want it to make her work-life balance worse.     Sleep is fine, 7-8 hrs a night. Denies difficulty falling and staying asleep.     Per previous HPI:  Florecita states she is doing well overall; has been a bit stressful with her family. Her  is struggling with MH issues, which is new for him. He has connected with a therapist so far.     Florecita's mood is good, denies depressive sx, denies SI/HI.     Anxiety is a bit elevated due to work, but manageable. Wants to be exercising more, hard to get there. She is an all or nothing type; if she does not  have time to do a full workout, she will not do it.     Has increased diaphoresis which is typical for her.    Florecita is not getting enough sleep, harder to fall asleep when anxiety is increased. Is more of a night owl. Getting 6.5-7.5 hrs of sleep a night. Will not attempt to go to bed until she gets sleepy.     Psychiatric Review Of Systems:  Depressive Symptoms: negative  Manic Symptoms: negative  Anxiety Symptoms: General Anxiety Disorder (JOSE)JOSE Behaviors: manageable  Psychotic Symptoms: negative  Trauma Symptoms: None  Other Symptoms/Concerns: insomnia  Delirium/Altered Mental Status Symptoms:Other: (comment) WNL    Current Medications:    Current Outpatient Medications:     fluticasone (Flonase) 50 mcg/actuation nasal spray, Administer 1 spray into each nostril once daily. Shake gently. Before first use, prime pump. After use, clean tip and replace cap., Disp: 16 g, Rfl: 3    gabapentin (Neurontin) 300 mg capsule, Take 1 capsule (300 mg) by mouth once daily at bedtime., Disp: 90 capsule, Rfl: 1    SUMAtriptan (Imitrex) 50 mg tablet, Take 1 tablet (50 mg) by mouth 1 time if needed for migraine. May repeat after 2 hours., Disp: 27 tablet, Rfl: 3    traZODone (Desyrel) 100 mg tablet, Take 1 tablet (100 mg) by mouth as needed at bedtime for sleep., Disp: 90 tablet, Rfl: 0    venlafaxine XR (Effexor-XR) 150 mg 24 hr capsule, Take 1 capsule (150 mg) by mouth once daily in the morning. Take before meals., Disp: 90 capsule, Rfl: 0    Medical History:  Past Medical History:   Diagnosis Date    Allergic     Anxiety 2003    Depression 2003    GERD (gastroesophageal reflux disease) 2007    Headache 2010    Personal history of other complications of pregnancy, childbirth and the puerperium 07/29/2021    History of postpartum depression       Past Psychiatric History:   Onset History: Depression/anxiety at 16 yo.   Inpatient history: 2022 [employer].   Suicide attempts/Self-Harm/Ideation History: SI in 2022.   Past  "providers: Lisa Al, CHRISTOPHER, Dr Dick De Los Santos, [facility].   Past medication trials: Viibryd- made depression worse, Lexapro- not effective, Latuda- 1st time helped, 2nd time not effective, bupropion  mg possible tinnitus, buspirone 5 mg BID, trazodone 50 mg, hydroxyzine 50 mg.       Family Psychiatric History  Maternal Grandmother    · Family history of depression      Social History:   Upbringing: Born and raised in [place]. One older sister. Parents , overall childhood was loving and supportive.   Trauma: Denies hx of abuse  Education: Doctorate in Veterinary Medicine  Work:  Oncologist   Marital Status:   Children: 2 children. boy 5 yo, 18 mo girl  Living situation: Lived with  and children  : Denies  Legal: Denies  Access to Weapons: No firearms in household  Guardian/POA/Payee:  self    Substance Use History:  Tobacco use: denies  Use of alcohol: denied  Use of caffeine: caffeinated soft drinks 2 /day  Use of other substances: denies  Legal consequences of substance use: denies  Substance use disorder treatment: n/a    Record Review: brief     Medical Review Of Systems:  A comprehensive review of systems was negative except for: Musculoskeletal: positive for foot pain from an injury    MEDICAL HISTORY  -PCP: HALLE Greene-CNP  -Pt reports currently is not pregnant, and currently is sexually active, had a tubal. LMP: 6/24/24    -TBI/head trauma/LOC/seizure hx: denies      Objective   Mental Status Exam  Appearance: Neat and clean in appearance, dressed appropriately.   Attitude: Calm, cooperative, and engaged in conversation.  Behavior: Appropriate eye contact.   Motor Activity: No psychomotor agitation or retardation. No abnormal movements, tremors or tics. No evidence of extrapyramidal symptoms or tardive dyskinesia.  Speech: Regular rate, rhythm, volume. Spontaneous, no pressured speech.  Mood: \"good\"  Affect: Euthymic, full range, mood " congruent.  Thought Process: Linear, logical, and goal-directed. No loose associations or gross thought disorganization.  Thought Content: Denied current suicidal ideation or thoughts of harm to self, denied homicidal ideation or thoughts of harm to others. No delusional thinking elicited. No perseverations or obsessions identified.   Perception: Did not endorse auditory or visual hallucinations, did not appear to be responding to hallucinatory stimuli.   Cognition: Alert, oriented x3. Preserved attention span and concentration, recent and remote memory. Adequate fund of knowledge. No deficits in language.   Insight: Good, in regards to understanding mental health condition  Judgement: Intact      Vitals:  There were no vitals filed for this visit.    Risk Assessment:  SI/HI ASSESSMENT  -Risk Assessment: Florecita Redmond is currently a low acute risk of suicide and self-harm due to no past suicide attempt(s) and not currently endorsing thoughts of suicide. Florecita Redmond is currently a low acute risk of violence and harm to others due to no past history of violence and not currently threatening others.  -Suicidal Risk Factors:   -Violence Risk Factors: none  -Protective Factors: sense of responsibility towards family, social support/connectedness, child related concerns/living with child <18 years, positive family relationships, hopefulness/future orientation, marriage/partnership, and employment  -Plan to Reduce Risk: Establish medication regimen and outpatient follow-up care    Florecita was seen today for mdd (major depressive disorder), anxiety and insomnia.  Diagnoses and all orders for this visit:  Recurrent major depressive disorder, in full remission (CMS-HCC)  -     venlafaxine XR (Effexor-XR) 150 mg 24 hr capsule; Take 1 capsule (150 mg) by mouth once daily in the morning. Take before meals.  Anxiety and depression  Primary insomnia  -     Discontinue: gabapentin (Neurontin) 300 mg capsule; Take 1 capsule  (300 mg) by mouth once daily at bedtime.  -     traZODone (Desyrel) 100 mg tablet; Take 1 tablet (100 mg) by mouth as needed at bedtime for sleep.  -     gabapentin (Neurontin) 300 mg capsule; Take 1 capsule (300 mg) by mouth once daily at bedtime.    Impression:  Florecita is doing well; mood is well-managed, denies depressive symptoms.  She has mild anxiety related to work but it is manageable.  Discussed treatment plan, will continue current medication regimen and plan to follow-up in 4 months.      Plan/Recommendations:  Medications:    -Continue gabapentin 300mg at bedtime for anxiety   -Continue trazodone 100 mg at bedtime as needed for sleep   - Continue venlafaxine  mg daily for depression and anxiety  Follow up: November 15th 9:00  Call  Psychiatry at [phone] with issues.  For [facility] residents, [employer] is a 24/7 hotline you can call for assistance [163.330.8499]. Please call 911/728 or go to your closest Emergency Room if you feel unsafe. This includes thoughts of hurting yourself or anyone else, or having other troubles such as hearing voices, seeing visions, or having new and scary thoughts about the people around you.    Review with patient: Treatment plan reviewed with the patient.    Time Spent:  Prep time: 1 min  Direct patient time: 17 min  Documentation time: 5 min  Total time: 23 min    HALLE Koenig-CNP

## 2024-07-15 NOTE — PATIENT INSTRUCTIONS
Plan/Recommendations:  Medications:    -Continue gabapentin 300mg at bedtime for anxiety   -Continue trazodone 100 mg at bedtime as needed for sleep   - Continue venlafaxine  mg daily for depression and anxiety  Follow up: November 15th 9:00  Call  Psychiatry at [phone] with issues.  For [facility] residents, [employer] is a 24/7 hotline you can call for assistance [[phone]]. Please call 694/722 or go to your closest Emergency Room if you feel unsafe. This includes thoughts of hurting yourself or anyone else, or having other troubles such as hearing voices, seeing visions, or having new and scary thoughts about the people around you.

## 2024-08-09 ENCOUNTER — APPOINTMENT (OUTPATIENT)
Dept: PHYSICAL THERAPY | Facility: CLINIC | Age: 39
End: 2024-08-09
Payer: COMMERCIAL

## 2024-08-16 ENCOUNTER — APPOINTMENT (OUTPATIENT)
Dept: PHYSICAL THERAPY | Facility: CLINIC | Age: 39
End: 2024-08-16
Payer: COMMERCIAL

## 2024-08-21 ENCOUNTER — APPOINTMENT (OUTPATIENT)
Dept: ORTHOPEDIC SURGERY | Facility: HOSPITAL | Age: 39
End: 2024-08-21
Payer: COMMERCIAL

## 2024-10-04 ENCOUNTER — APPOINTMENT (OUTPATIENT)
Dept: PRIMARY CARE | Facility: CLINIC | Age: 39
End: 2024-10-04
Payer: COMMERCIAL

## 2024-11-15 ENCOUNTER — APPOINTMENT (OUTPATIENT)
Dept: BEHAVIORAL HEALTH | Facility: CLINIC | Age: 39
End: 2024-11-15
Payer: COMMERCIAL

## 2024-11-15 ENCOUNTER — HOSPITAL ENCOUNTER (OUTPATIENT)
Dept: RADIOLOGY | Facility: CLINIC | Age: 39
Discharge: HOME | End: 2024-11-15
Payer: COMMERCIAL

## 2024-11-15 VITALS — WEIGHT: 220 LBS | BODY MASS INDEX: 35.51 KG/M2

## 2024-11-15 DIAGNOSIS — F51.01 PRIMARY INSOMNIA: ICD-10-CM

## 2024-11-15 DIAGNOSIS — F33.42 RECURRENT MAJOR DEPRESSIVE DISORDER, IN FULL REMISSION (CMS-HCC): ICD-10-CM

## 2024-11-15 DIAGNOSIS — F32.A ANXIETY AND DEPRESSION: ICD-10-CM

## 2024-11-15 DIAGNOSIS — F41.9 ANXIETY AND DEPRESSION: ICD-10-CM

## 2024-11-15 DIAGNOSIS — Z12.31 SCREENING MAMMOGRAM FOR BREAST CANCER: ICD-10-CM

## 2024-11-15 PROCEDURE — 99214 OFFICE O/P EST MOD 30 MIN: CPT

## 2024-11-15 PROCEDURE — 1036F TOBACCO NON-USER: CPT

## 2024-11-15 PROCEDURE — 77067 SCR MAMMO BI INCL CAD: CPT

## 2024-11-15 RX ORDER — GABAPENTIN 100 MG/1
200 CAPSULE ORAL NIGHTLY
Qty: 180 CAPSULE | Refills: 0 | Status: SHIPPED | OUTPATIENT
Start: 2024-11-15

## 2024-11-15 RX ORDER — VENLAFAXINE HYDROCHLORIDE 150 MG/1
150 CAPSULE, EXTENDED RELEASE ORAL
Qty: 90 CAPSULE | Refills: 0 | Status: SHIPPED | OUTPATIENT
Start: 2024-11-15

## 2024-11-15 RX ORDER — TRAZODONE HYDROCHLORIDE 100 MG/1
100 TABLET ORAL NIGHTLY PRN
Qty: 90 TABLET | Refills: 0 | Status: SHIPPED | OUTPATIENT
Start: 2024-11-15

## 2024-11-15 NOTE — PATIENT INSTRUCTIONS
Plan/Recommendations:  Medications:    -Continue gabapentin 300mg at bedtime for anxiety   -Continue trazodone 100 mg at bedtime as needed for sleep   - Continue venlafaxine  mg daily for depression and anxiety  Follow up: Jan 31st 9:00  Call  Psychiatry at [phone] with issues.  For [facility] residents, [employer] is a 24/7 hotline you can call for assistance [268.142.5726]. Please call [phone] or go to your closest Emergency Room if you feel unsafe. This includes thoughts of hurting yourself or anyone else, or having other troubles such as hearing voices, seeing visions, or having new and scary thoughts about the people around you.

## 2024-11-15 NOTE — PROGRESS NOTES
Outpatient Psychiatry- Follow up visit    Subjective   Florecita Redmond, a 39 y.o. female, presenting for follow up visit for   Chief Complaint   Patient presents with    Anxiety    MDD (Major Depressive Disorder)      Virtual or Telephone Consent    An interactive audio and video telecommunication system which permits real time communications between the patient (at the originating site) and provider (at the distant site) was utilized to provide this telehealth service.   Verbal consent was requested and obtained from Florecita Redmond on this date, 11/15/24 for a telehealth visit.       HPI:  Florecita reports things are going well, still recovering from the time change and shorter days. Mood is good, denies sx of depression.    Work is good, busy. Anxiety is at baseline. Having some issues with 5 yo with managing emotions, started therapy. He has had a lot of anger.     NOTE: Symptom scale is rated where 0 = no symptoms at all, and 10 = symptoms so severe that pt is an imminent danger to themselves or others and requires hospitalization.    Anxiety and Depression remains present less days than not, which has improved over the past few weeks. Rates the severity of depressive symptoms as a 0/10, and anxiety at 2/10. noting symptom improvement with removing herself from situation and listening to music and worsening of symptoms with work stress and family stress.    Sleep is good, denies problems.     Need PT for her ankle, hasn't started yet.     Per previous HPI:  Florecita states she is doing well; hurt her foot and having difficulty walking. Rolled her ankle at exercise class.     Mood is good, denies depressive sx. Denies SI/HI and passive thoughts of death.     Anxiety is at baseline, mostly work related.     NOTE: Symptom scale is rated where 0 = no symptoms at all, and 10 = symptoms so severe that pt is an imminent danger to themselves or others and requires hospitalization.    Anxiety remains present more days than not, which  has remained unchanged over the past few weeks. Rates the severity of psych symptoms as a 3/10, noting symptom improvement with removing herself from situation, laugh, listen to music and worsening of symptoms with work stress and family stress.    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 Does not want it to make her work-life balance worse.     Sleep is fine, 7-8 hrs a night. Denies difficulty falling and staying asleep.     Psychiatric Review Of Systems:  Depressive Symptoms: negative  Manic Symptoms: negative  Anxiety Symptoms: General Anxiety Disorder (JOSE)JOSE Behaviors: manageable  Psychotic Symptoms: negative  Trauma Symptoms: None  Other Symptoms/Concerns: insomnia  Delirium/Altered Mental Status Symptoms:Other: (comment) WNL    Current Medications:    Current Outpatient Medications:     fluticasone (Flonase) 50 mcg/actuation nasal spray, Administer 1 spray into each nostril once daily. Shake gently. Before first use, prime pump. After use, clean tip and replace cap., Disp: 16 g, Rfl: 3    gabapentin (Neurontin) 100 mg capsule, Take 2 capsules (200 mg) by mouth once daily at bedtime., Disp: 180 capsule, Rfl: 0    gabapentin (Neurontin) 300 mg capsule, Take 1 capsule (300 mg) by mouth once daily at bedtime., Disp: 90 capsule, Rfl: 1    SUMAtriptan (Imitrex) 50 mg tablet, Take 1 tablet (50 mg) by mouth 1 time if needed for migraine. May repeat after 2 hours., Disp: 27 tablet, Rfl: 3    traZODone (Desyrel) 100 mg tablet, Take 1 tablet (100 mg) by mouth as needed at bedtime for sleep., Disp: 90 tablet, Rfl: 0    venlafaxine XR (Effexor-XR) 150 mg 24 hr capsule, Take 1 capsule (150 mg) by mouth once daily in the morning. Take before meals., Disp: 90 capsule, Rfl: 0    Medical History:  Past Medical History:   Diagnosis Date    Allergic     Anxiety 2003    Depression 2003    GERD (gastroesophageal reflux disease) 2007    Headache 2010    Personal history of other complications of pregnancy,  childbirth and the puerperium 07/29/2021    History of postpartum depression       Past Psychiatric History:   Onset History: Depression/anxiety at 18 yo.   Inpatient history: 2022 [place].   Suicide attempts/Self-Harm/Ideation History: SI in 2022.   Past providers: Lisa Al, NP, Dr Dick De Los Santos, [facility].   Past medication trials: Viibryd- made depression worse, Lexapro- not effective, Latuda- 1st time helped, 2nd time not effective, bupropion  mg possible tinnitus, buspirone 5 mg BID, trazodone 50 mg, hydroxyzine 50 mg.       Family Psychiatric History  Maternal Grandmother    · Family history of depression      Social History:   Upbringing: Born and raised in [place]. One older sister. Parents , overall childhood was loving and supportive.   Trauma: Denies hx of abuse  Education: Doctorate in Veterinary Medicine  Work:  Oncologist   Marital Status:   Children: 2 children. boy 3 yo, 18 mo girl  Living situation: Lived with  and children  : Denies  Legal: Denies  Access to Weapons: No firearms in household  Guardian/POA/Payee:  self    Substance Use History:  Tobacco use: denies  Use of alcohol: denied  Use of caffeine: caffeinated soft drinks 2 /day  Use of other substances: denies  Legal consequences of substance use: denies  Substance use disorder treatment: n/a    Record Review: brief     Medical Review Of Systems:  A comprehensive review of systems was negative except for: Musculoskeletal: positive for foot pain from an injury    MEDICAL HISTORY  -PCP: Ghislaine Munguia, APRN-CNP  -Pt reports currently is not pregnant, and currently is sexually active, had a tubal. LMP: 11/9/24    -TBI/head trauma/LOC/seizure hx: denies      Objective   Mental Status Exam  Appearance: Neat and clean in appearance, dressed appropriately.   Attitude: Calm, cooperative, and engaged in conversation.  Behavior: Appropriate eye contact.   Motor Activity: No  "psychomotor agitation or retardation. No abnormal movements, tremors or tics. No evidence of extrapyramidal symptoms or tardive dyskinesia.  Speech: Regular rate, rhythm, volume. Spontaneous, no pressured speech.  Mood: \"good\"  Affect: Euthymic, full range, mood congruent.  Thought Process: Linear, logical, and goal-directed. No loose associations or gross thought disorganization.  Thought Content: Denied current suicidal ideation or thoughts of harm to self, denied homicidal ideation or thoughts of harm to others. No delusional thinking elicited. No perseverations or obsessions identified.   Perception: Did not endorse auditory or visual hallucinations, did not appear to be responding to hallucinatory stimuli.   Cognition: Alert, oriented x3. Preserved attention span and concentration, recent and remote memory. Adequate fund of knowledge. No deficits in language.   Insight: Good, in regards to understanding mental health condition  Judgement: Intact      Vitals:  There were no vitals filed for this visit.    Risk Assessment:  SI/HI ASSESSMENT  -Risk Assessment: Florecita Redmond is currently a low acute risk of suicide and self-harm due to no past suicide attempt(s) and not currently endorsing thoughts of suicide. Florecita Redmond is currently a low acute risk of violence and harm to others due to no past history of violence and not currently threatening others.  -Suicidal Risk Factors:   -Violence Risk Factors: none  -Protective Factors: sense of responsibility towards family, social support/connectedness, child related concerns/living with child <18 years, positive family relationships, hopefulness/future orientation, marriage/partnership, and employment  -Plan to Reduce Risk: Establish medication regimen and outpatient follow-up care    Florecita was seen today for anxiety and mdd (major depressive disorder).  Diagnoses and all orders for this visit:  Recurrent major depressive disorder, in full remission (CMS-HCC)  - "     venlafaxine XR (Effexor-XR) 150 mg 24 hr capsule; Take 1 capsule (150 mg) by mouth once daily in the morning. Take before meals.  -     Follow Up In Psychiatry; Future  Anxiety and depression  -     gabapentin (Neurontin) 100 mg capsule; Take 2 capsules (200 mg) by mouth once daily at bedtime.  -     Follow Up In Psychiatry; Future  Primary insomnia  -     gabapentin (Neurontin) 100 mg capsule; Take 2 capsules (200 mg) by mouth once daily at bedtime.  -     traZODone (Desyrel) 100 mg tablet; Take 1 tablet (100 mg) by mouth as needed at bedtime for sleep.  -     Follow Up In Psychiatry; Future      Impression:  Florecita continues to do well; mood is well-managed, denies depressive symptoms.  She has mild anxiety related to work and her son but it is manageable.  Discussed treatment plan; Florecita is wondering if she should decrease her nighttime medications.  Discussed dropping down her gabapentin to 200 mg and continue remaining regimen.  Florecita agreeable. Plan to follow-up in 3 months.    Plan/Recommendations:  Medications:    -Continue gabapentin 300mg at bedtime for anxiety   -Continue trazodone 100 mg at bedtime as needed for sleep   - Continue venlafaxine  mg daily for depression and anxiety  Follow up: Jan 31st 9:00  Call  Psychiatry at [phone] with issues.  For [facility] residents, [email or web address] is a 24/7 hotline you can call for assistance [727.545.6631]. Please call [phone] or go to your closest Emergency Room if you feel unsafe. This includes thoughts of hurting yourself or anyone else, or having other troubles such as hearing voices, seeing visions, or having new and scary thoughts about the people around you.    Review with patient: Treatment plan reviewed with the patient.    Time Spent:  Prep time: 2 min  Direct patient time: 23 min  Documentation time: 5 min  Total time: 30 min    HALLE Koenig-CNP

## 2024-12-16 DIAGNOSIS — N39.0 URINARY TRACT INFECTION WITHOUT HEMATURIA, SITE UNSPECIFIED: ICD-10-CM

## 2024-12-17 ENCOUNTER — LAB (OUTPATIENT)
Dept: LAB | Facility: LAB | Age: 39
End: 2024-12-17
Payer: COMMERCIAL

## 2024-12-17 DIAGNOSIS — N39.0 URINARY TRACT INFECTION WITHOUT HEMATURIA, SITE UNSPECIFIED: ICD-10-CM

## 2024-12-17 DIAGNOSIS — R94.6 ABNORMAL THYROID FUNCTION TEST: ICD-10-CM

## 2024-12-17 LAB
APPEARANCE UR: CLEAR
BACTERIA #/AREA URNS AUTO: ABNORMAL /HPF
BILIRUB UR STRIP.AUTO-MCNC: NEGATIVE MG/DL
COLOR UR: ABNORMAL
GLUCOSE UR STRIP.AUTO-MCNC: NORMAL MG/DL
KETONES UR STRIP.AUTO-MCNC: NEGATIVE MG/DL
LEUKOCYTE ESTERASE UR QL STRIP.AUTO: NEGATIVE
MUCOUS THREADS #/AREA URNS AUTO: ABNORMAL /LPF
NITRITE UR QL STRIP.AUTO: NEGATIVE
PH UR STRIP.AUTO: 5.5 [PH]
PROT UR STRIP.AUTO-MCNC: NEGATIVE MG/DL
RBC # UR STRIP.AUTO: ABNORMAL /UL
RBC #/AREA URNS AUTO: ABNORMAL /HPF
SP GR UR STRIP.AUTO: 1.01
SQUAMOUS #/AREA URNS AUTO: ABNORMAL /HPF
T4 FREE SERPL-MCNC: 0.78 NG/DL (ref 0.61–1.12)
THYROPEROXIDASE AB SERPL-ACNC: 537 IU/ML
TSH SERPL-ACNC: 4.41 MIU/L (ref 0.44–3.98)
UROBILINOGEN UR STRIP.AUTO-MCNC: NORMAL MG/DL
WBC #/AREA URNS AUTO: ABNORMAL /HPF

## 2024-12-17 PROCEDURE — 36415 COLL VENOUS BLD VENIPUNCTURE: CPT

## 2024-12-17 PROCEDURE — 84443 ASSAY THYROID STIM HORMONE: CPT

## 2024-12-17 PROCEDURE — 87086 URINE CULTURE/COLONY COUNT: CPT

## 2024-12-17 PROCEDURE — 84439 ASSAY OF FREE THYROXINE: CPT

## 2024-12-17 PROCEDURE — 86376 MICROSOMAL ANTIBODY EACH: CPT

## 2024-12-17 PROCEDURE — 81001 URINALYSIS AUTO W/SCOPE: CPT

## 2024-12-18 DIAGNOSIS — R94.6 ABNORMAL THYROID FUNCTION TEST: Primary | ICD-10-CM

## 2024-12-18 LAB — BACTERIA UR CULT: NORMAL

## 2024-12-19 ENCOUNTER — APPOINTMENT (OUTPATIENT)
Dept: PRIMARY CARE | Facility: CLINIC | Age: 39
End: 2024-12-19
Payer: COMMERCIAL

## 2024-12-20 ENCOUNTER — APPOINTMENT (OUTPATIENT)
Dept: PRIMARY CARE | Facility: CLINIC | Age: 39
End: 2024-12-20
Payer: COMMERCIAL

## 2024-12-20 VITALS
TEMPERATURE: 97.2 F | OXYGEN SATURATION: 97 % | HEIGHT: 66 IN | SYSTOLIC BLOOD PRESSURE: 116 MMHG | HEART RATE: 92 BPM | DIASTOLIC BLOOD PRESSURE: 90 MMHG | WEIGHT: 224.4 LBS | BODY MASS INDEX: 36.07 KG/M2

## 2024-12-20 DIAGNOSIS — G43.109 MIGRAINE WITH AURA AND WITHOUT STATUS MIGRAINOSUS, NOT INTRACTABLE: ICD-10-CM

## 2024-12-20 DIAGNOSIS — E66.812 CLASS 2 OBESITY DUE TO EXCESS CALORIES WITHOUT SERIOUS COMORBIDITY WITH BODY MASS INDEX (BMI) OF 36.0 TO 36.9 IN ADULT: ICD-10-CM

## 2024-12-20 DIAGNOSIS — Z00.00 ROUTINE GENERAL MEDICAL EXAMINATION AT A HEALTH CARE FACILITY: Primary | ICD-10-CM

## 2024-12-20 DIAGNOSIS — J30.2 SEASONAL ALLERGIC RHINITIS, UNSPECIFIED TRIGGER: ICD-10-CM

## 2024-12-20 DIAGNOSIS — E66.09 CLASS 2 OBESITY DUE TO EXCESS CALORIES WITHOUT SERIOUS COMORBIDITY WITH BODY MASS INDEX (BMI) OF 36.0 TO 36.9 IN ADULT: ICD-10-CM

## 2024-12-20 DIAGNOSIS — Z23 ENCOUNTER FOR IMMUNIZATION: ICD-10-CM

## 2024-12-20 DIAGNOSIS — R94.6 ABNORMAL THYROID FUNCTION TEST: ICD-10-CM

## 2024-12-20 PROCEDURE — 3008F BODY MASS INDEX DOCD: CPT | Performed by: NURSE PRACTITIONER

## 2024-12-20 PROCEDURE — 1036F TOBACCO NON-USER: CPT | Performed by: NURSE PRACTITIONER

## 2024-12-20 PROCEDURE — 90656 IIV3 VACC NO PRSV 0.5 ML IM: CPT | Performed by: NURSE PRACTITIONER

## 2024-12-20 PROCEDURE — 99396 PREV VISIT EST AGE 40-64: CPT | Performed by: NURSE PRACTITIONER

## 2024-12-20 PROCEDURE — 90471 IMMUNIZATION ADMIN: CPT | Performed by: NURSE PRACTITIONER

## 2024-12-20 RX ORDER — SUMATRIPTAN SUCCINATE 50 MG/1
50 TABLET ORAL ONCE AS NEEDED
Qty: 27 TABLET | Refills: 3 | Status: SHIPPED | OUTPATIENT
Start: 2024-12-20 | End: 2025-12-20

## 2024-12-20 RX ORDER — ALBUTEROL SULFATE 90 UG/1
2 INHALANT RESPIRATORY (INHALATION) EVERY 4 HOURS PRN
Qty: 18 G | Refills: 1 | Status: SHIPPED | OUTPATIENT
Start: 2024-12-20 | End: 2025-01-19

## 2024-12-20 ASSESSMENT — ENCOUNTER SYMPTOMS
PALPITATIONS: 0
FREQUENCY: 0
SHORTNESS OF BREATH: 0
BLOOD IN STOOL: 0
DEPRESSION: 0
NERVOUS/ANXIOUS: 0
HEADACHES: 0
ABDOMINAL PAIN: 0
UNEXPECTED WEIGHT CHANGE: 0
OCCASIONAL FEELINGS OF UNSTEADINESS: 0
DIARRHEA: 0
COUGH: 0
FATIGUE: 0
NAUSEA: 0
ARTHRALGIAS: 0
CONSTIPATION: 0
MYALGIAS: 0
DYSURIA: 0
APPETITE CHANGE: 0
VOMITING: 0
BACK PAIN: 0
LOSS OF SENSATION IN FEET: 0

## 2024-12-20 NOTE — PROGRESS NOTES
Adolfo Redmond is a 39 y.o. female and is here for a comprehensive physical exam. The patient reports no problems.      History:  LMP: No LMP recorded.  Menopause at NA years  Last pap date: UTD  Abnormal pap? no    Mammogram: UTD - dense breast     Migraines occurring 4-5 times a month  Sumatriptan working well for migraines    Increase urinary frequency  Usually goes away on own  Seems prompted by sex  Preventative - increasing water, cranberry supplement    Allergy to in laws cat  Antihistamines - taken    Recent blood work reviewed    Do you have pain that bothers you in your daily life? yes    Review of Systems   Constitutional:  Negative for appetite change, fatigue and unexpected weight change.   HENT:  Negative for congestion, ear pain and hearing loss.    Eyes:  Negative for visual disturbance.   Respiratory:  Negative for cough and shortness of breath.    Cardiovascular:  Negative for chest pain, palpitations and leg swelling.   Gastrointestinal:  Negative for abdominal pain, blood in stool, constipation, diarrhea, nausea and vomiting.   Genitourinary:  Negative for dysuria, frequency and urgency.   Musculoskeletal:  Negative for arthralgias, back pain and myalgias.   Skin:  Negative for rash.   Neurological:  Negative for syncope and headaches.   Psychiatric/Behavioral:  The patient is not nervous/anxious.         Objective   Physical Exam  Vitals and nursing note reviewed.   Constitutional:       General: She is not in acute distress.     Appearance: Normal appearance. She is not toxic-appearing.   HENT:      Head: Normocephalic.      Right Ear: Tympanic membrane, ear canal and external ear normal.      Left Ear: Tympanic membrane, ear canal and external ear normal.      Nose: Nose normal.      Mouth/Throat:      Mouth: Mucous membranes are moist.      Pharynx: Oropharynx is clear.   Eyes:      Conjunctiva/sclera: Conjunctivae normal.   Neck:      Thyroid: No thyromegaly.   Cardiovascular:       Rate and Rhythm: Normal rate and regular rhythm.      Pulses: Normal pulses.      Heart sounds: Normal heart sounds. No murmur heard.  Pulmonary:      Effort: Pulmonary effort is normal. No respiratory distress.      Breath sounds: Normal breath sounds.   Abdominal:      General: Bowel sounds are normal.      Palpations: Abdomen is soft.      Tenderness: There is no abdominal tenderness.   Musculoskeletal:         General: Normal range of motion.      Cervical back: Neck supple.   Lymphadenopathy:      Cervical: No cervical adenopathy.   Skin:     General: Skin is warm and dry.      Capillary Refill: Capillary refill takes less than 2 seconds.      Findings: No rash.   Neurological:      General: No focal deficit present.      Gait: Gait normal.   Psychiatric:         Mood and Affect: Mood normal.         Judgment: Judgment normal.         Assessment/Plan   Healthy female exam.     1. Continue sumatriptan for migraines as needed  2. Patient Counseling:  --Nutrition: Stressed importance of moderation in sodium/caffeine intake, saturated fat and cholesterol, caloric balance, sufficient intake of fresh fruits, vegetables, fiber, calcium, iron, and 1 mg of folate supplement per day (for females capable of pregnancy).  --Discussed the issue of estrogen replacement, calcium supplement, and the daily use of baby aspirin.  --Exercise: Stressed the importance of regular exercise.   --Substance Abuse: Discussed cessation/primary prevention of tobacco, alcohol, or other drug use; driving or other dangerous activities under the influence; availability of treatment for abuse.    --Sexuality: Discussed sexually transmitted diseases, partner selection, use of condoms, avoidance of unintended pregnancy  and contraceptive alternatives.   --Injury prevention: Discussed safety belts, safety helmets, smoke detector, smoking near bedding or upholstery.   --Dental health: Discussed importance of regular tooth brushing, flossing, and  dental visits.  --Immunizations reviewed.  --Discussed benefits of screening colonoscopy.  --After hours service discussed with patient  3. Discussed the patient's BMI with her.  The BMI is above average. The patient received Provided instructions on dietary changes  Provided instructions on exercise  Advised to Increase physical activity because they have an above normal BMI.  4. Follow up next physical in 1 year    Patient Instructions   It is important that you have yearly check-ups with your healthcare provider to ensure that you stay up to date on your health, screenings, and vaccinations, even if you feel healthy.     Make sure you eat a diet rich in fruits, vegetables, nuts, beans, whole grains, lean meat, eggs, calcium, and good fats (i.e. olive oil, avocado baked fish). AVOID a diet that is high in red meat, trans- and saturated fats, sweetened beverages, and refined grains (i.e. white bread, rice, sweetened cereals).     Stay hydrated with at least 64 ounces of water daily.    Exercise most days per week, for at least 30-45 minutes per session.     Be sure to wear sunscreen of SPF of 15 or higher if you are going to be outside.    Stress management and coping skills are important to manage our stress levels. Some tips include keep in mind that stress isn't a bad thing, talk about your problems, prioritize your responsibilities, focus on the basics, don't put all your eggs in one basket, set aside time for yourself, and keep things in perspective.     Stay up to date with annual eye exams and twice yearly dental exams.    Recommend annual flu vaccination, in addition to age appropriate recommended vaccines.    PAP smear is recommended every 2-3 years for women 21-29 and every 3-5 years for women 30-65.  Annual mammogram is recommended for women 40-75    Colon cancer screening based on age, family history, and other risk factors.    Follow up with Endocrinology for follow up for high TPO    Migraines:  continue as needed sumatriptan  Avoid triggers as able    Flu shot today    Follow up for annual well check in 1 year.

## 2024-12-20 NOTE — PATIENT INSTRUCTIONS
It is important that you have yearly check-ups with your healthcare provider to ensure that you stay up to date on your health, screenings, and vaccinations, even if you feel healthy.     Make sure you eat a diet rich in fruits, vegetables, nuts, beans, whole grains, lean meat, eggs, calcium, and good fats (i.e. olive oil, avocado baked fish). AVOID a diet that is high in red meat, trans- and saturated fats, sweetened beverages, and refined grains (i.e. white bread, rice, sweetened cereals).     Stay hydrated with at least 64 ounces of water daily.    Exercise most days per week, for at least 30-45 minutes per session.     Be sure to wear sunscreen of SPF of 15 or higher if you are going to be outside.    Stress management and coping skills are important to manage our stress levels. Some tips include keep in mind that stress isn't a bad thing, talk about your problems, prioritize your responsibilities, focus on the basics, don't put all your eggs in one basket, set aside time for yourself, and keep things in perspective.     Stay up to date with annual eye exams and twice yearly dental exams.    Recommend annual flu vaccination, in addition to age appropriate recommended vaccines.    PAP smear is recommended every 2-3 years for women 21-29 and every 3-5 years for women 30-65.  Annual mammogram is recommended for women 40-75    Colon cancer screening based on age, family history, and other risk factors.    Follow up with Endocrinology for follow up for high TPO    Migraines: continue as needed sumatriptan  Avoid triggers as able    Flu shot today    Follow up for annual well check in 1 year.

## 2024-12-27 ENCOUNTER — APPOINTMENT (OUTPATIENT)
Dept: PRIMARY CARE | Facility: CLINIC | Age: 39
End: 2024-12-27
Payer: COMMERCIAL

## 2025-01-31 ENCOUNTER — APPOINTMENT (OUTPATIENT)
Dept: BEHAVIORAL HEALTH | Facility: CLINIC | Age: 40
End: 2025-01-31
Payer: COMMERCIAL

## 2025-01-31 DIAGNOSIS — F33.42 RECURRENT MAJOR DEPRESSIVE DISORDER, IN FULL REMISSION (CMS-HCC): ICD-10-CM

## 2025-01-31 DIAGNOSIS — F32.A ANXIETY AND DEPRESSION: ICD-10-CM

## 2025-01-31 DIAGNOSIS — F41.9 ANXIETY AND DEPRESSION: ICD-10-CM

## 2025-01-31 DIAGNOSIS — F51.01 PRIMARY INSOMNIA: ICD-10-CM

## 2025-01-31 PROCEDURE — 99213 OFFICE O/P EST LOW 20 MIN: CPT

## 2025-01-31 PROCEDURE — 1036F TOBACCO NON-USER: CPT

## 2025-01-31 RX ORDER — GABAPENTIN 100 MG/1
200 CAPSULE ORAL NIGHTLY
Qty: 180 CAPSULE | Refills: 0 | Status: SHIPPED | OUTPATIENT
Start: 2025-01-31

## 2025-01-31 RX ORDER — TRAZODONE HYDROCHLORIDE 100 MG/1
100 TABLET ORAL NIGHTLY PRN
Qty: 90 TABLET | Refills: 0 | Status: SHIPPED | OUTPATIENT
Start: 2025-01-31

## 2025-01-31 RX ORDER — VENLAFAXINE HYDROCHLORIDE 150 MG/1
150 CAPSULE, EXTENDED RELEASE ORAL
Qty: 90 CAPSULE | Refills: 0 | Status: SHIPPED | OUTPATIENT
Start: 2025-01-31

## 2025-01-31 NOTE — PROGRESS NOTES
Outpatient Psychiatry- Follow up visit    Subjective   Florecita Redmond, a 39 y.o. female, presenting for follow up visit for   Chief Complaint   Patient presents with    MDD (Major Depressive Disorder)    Anxiety      Virtual or Telephone Consent    An interactive audio and video telecommunication system which permits real time communications between the patient (at the originating site) and provider (at the distant site) was utilized to provide this telehealth service.   Verbal consent was requested and obtained from Florecita Redmond on this date, 01/31/25 for a telehealth visit.     HPI:  Reports things are going well, holidays were good. Went to [place] for a week. Mood is good, denies any depressive sx with the winter months.  Anxiety is OK, work has been stressful, causing migraines. Thinks it will slow down soon, getting a migraine daily. Takes sumatriptan as needed.    Sleep is good, daughter has been disruptive to her sleep with her going to the [place] but getting 7-8 hrs of sleep.       Per previous HPI:  Florecita reports things are going well, still recovering from the time change and shorter days. Mood is good, denies sx of depression.    Work is good, busy. Anxiety is at baseline. Having some issues with 7 yo with managing emotions, started therapy. He has had a lot of anger.     NOTE: Symptom scale is rated where 0 = no symptoms at all, and 10 = symptoms so severe that pt is an imminent danger to themselves or others and requires hospitalization.    Anxiety and Depression remains present less days than not, which has improved over the past few weeks. Rates the severity of depressive symptoms as a 0/10, and anxiety at 2/10. noting symptom improvement with removing herself from situation and listening to music and worsening of symptoms with work stress and family stress.    Sleep is good, denies problems.     Need PT for her ankle, hasn't started yet.     Per previous HPI:  Florecita states she is doing well; hurt her foot and  having difficulty walking. Rolled her ankle at exercise class.     Mood is good, denies depressive sx. Denies SI/HI and passive thoughts of death.     Anxiety is at baseline, mostly work related.     NOTE: Symptom scale is rated where 0 = no symptoms at all, and 10 = symptoms so severe that pt is an imminent danger to themselves or others and requires hospitalization.    Anxiety remains present more days than not, which has remained unchanged over the past few weeks. Rates the severity of psych symptoms as a 3/10, noting symptom improvement with removing herself from situation, laugh, listen to music and worsening of symptoms with work stress and family stress.    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 Does not want it to make her work-life balance worse.     Sleep is fine, 7-8 hrs a night. Denies difficulty falling and staying asleep.     Psychiatric Review Of Systems:  Depressive Symptoms: negative  Manic Symptoms: negative  Anxiety Symptoms: General Anxiety Disorder (JOSE)JOSE Behaviors: manageable  Psychotic Symptoms: negative  Trauma Symptoms: None  Other Symptoms/Concerns: insomnia- going well with meds  Delirium/Altered Mental Status Symptoms:Other: (comment) WNL    Current Medications:    Current Outpatient Medications:     albuterol 90 mcg/actuation inhaler, Inhale 2 puffs every 4 hours if needed for wheezing., Disp: 18 g, Rfl: 1    fluticasone (Flonase) 50 mcg/actuation nasal spray, Administer 1 spray into each nostril once daily. Shake gently. Before first use, prime pump. After use, clean tip and replace cap., Disp: 16 g, Rfl: 3    gabapentin (Neurontin) 100 mg capsule, Take 2 capsules (200 mg) by mouth once daily at bedtime., Disp: 180 capsule, Rfl: 0    SUMAtriptan (Imitrex) 50 mg tablet, Take 1 tablet (50 mg) by mouth 1 time if needed for migraine. May repeat after 2 hours., Disp: 27 tablet, Rfl: 3    traZODone (Desyrel) 100 mg tablet, Take 1 tablet (100 mg) by mouth as needed at  bedtime for sleep., Disp: 90 tablet, Rfl: 0    venlafaxine XR (Effexor-XR) 150 mg 24 hr capsule, Take 1 capsule (150 mg) by mouth once daily in the morning. Take before meals., Disp: 90 capsule, Rfl: 0    Medical History:  Past Medical History:   Diagnosis Date    Allergic     Anxiety 2003    Depression 2003    GERD (gastroesophageal reflux disease) 2007    Headache 2010    Personal history of other complications of pregnancy, childbirth and the puerperium 07/29/2021    History of postpartum depression       Past Psychiatric History:   Onset History: Depression/anxiety at 18 yo.   Inpatient history: 2022 [facility].   Suicide attempts/Self-Harm/Ideation History: SI in 2022.   Past providers: Lisa Al NP, Dr Dick De Los Santos, [facility].   Past medication trials: Viibryd- made depression worse, Lexapro- not effective, Latuda- 1st time helped, 2nd time not effective, bupropion  mg possible tinnitus, buspirone 5 mg BID, trazodone 50 mg, hydroxyzine 50 mg.       Family Psychiatric History  Maternal Grandmother    · Family history of depression      Social History:   Upbringing: Born and raised in [place]. One older sister. Parents , overall childhood was loving and supportive.   Trauma: Denies hx of abuse  Education: Doctorate in Veterinary Medicine  Work:  Oncologist   Marital Status:   Children: 2 children. boy 5 yo, 18 mo girl  Living situation: Lived with  and children  : Denies  Legal: Denies  Access to Weapons: No firearms in household  Guardian/POA/Payee:  self    Substance Use History:  Tobacco use: denies  Use of alcohol: denied  Use of caffeine: caffeinated soft drinks 2 /day  Use of other substances: denies  Legal consequences of substance use: denies  Substance use disorder treatment: n/a    Record Review: brief     Medical Review Of Systems:  A comprehensive review of systems was negative except for: Musculoskeletal: positive for foot pain from  "an injury    MEDICAL HISTORY  -PCP: Ghislaine Munguia, APRN-CNP  -Pt reports currently is not pregnant, and currently is sexually active, had a tubal. LMP: 1/15/25    -TBI/head trauma/LOC/seizure hx: denies      Objective   Mental Status Exam  Appearance: Neat and clean in appearance, dressed appropriately.   Attitude: Calm, cooperative, and engaged in conversation.  Behavior: Appropriate eye contact.   Motor Activity: No psychomotor agitation or retardation. No abnormal movements, tremors or tics. No evidence of extrapyramidal symptoms or tardive dyskinesia.  Speech: Regular rate, rhythm, volume. Spontaneous, no pressured speech.  Mood: \"good\"  Affect: Euthymic, full range, mood congruent.  Thought Process: Linear, logical, and goal-directed. No loose associations or gross thought disorganization.  Thought Content: Denied current suicidal ideation or thoughts of harm to self, denied homicidal ideation or thoughts of harm to others. No delusional thinking elicited. No perseverations or obsessions identified.   Perception: Did not endorse auditory or visual hallucinations, did not appear to be responding to hallucinatory stimuli.   Cognition: Alert, oriented x3. Preserved attention span and concentration, recent and remote memory. Adequate fund of knowledge. No deficits in language.   Insight: Good, in regards to understanding mental health condition  Judgement: Intact      Vitals:  There were no vitals filed for this visit.    Risk Assessment:  SI/HI ASSESSMENT  -Risk Assessment: Florecita Redmond is currently a low acute risk of suicide and self-harm due to no past suicide attempt(s) and not currently endorsing thoughts of suicide. Florecita Redmond is currently a low acute risk of violence and harm to others due to no past history of violence and not currently threatening others.  -Suicidal Risk Factors:   -Violence Risk Factors: none  -Protective Factors: sense of responsibility towards family, social " support/connectedness, child related concerns/living with child <18 years, positive family relationships, hopefulness/future orientation, marriage/partnership, and employment  -Plan to Reduce Risk: Establish medication regimen and outpatient follow-up care    Florecita was seen today for mdd (major depressive disorder) and anxiety.  Diagnoses and all orders for this visit:  Recurrent major depressive disorder, in full remission (CMS-HCC)  -     Follow Up In Psychiatry  Anxiety and depression  -     Follow Up In Psychiatry  Primary insomnia  -     Follow Up In Psychiatry      Impression:  Florecita continues to do well; mood is well-managed, denies depressive symptoms.  She has mild anxiety related to work and her son but it is manageable.  Discussed treatment plan; Florecita Has done well with gabapentin at 200 mg.  She would like to try to decrease to 100 mg at bedtime.  If she has difficulty with anxiety and sleep she can go back to 200 mg at bedtime.  Will continue remaining medication regimen and plan to follow-up in 3 months.    Plan/Recommendations:  Medications:    -Decrease gabapentin to 100 mg at bedtime for anxiety.  May go back to 200 mg at bedtime if anxiety increases   -Continue trazodone 100 mg at bedtime as needed for sleep   - Continue venlafaxine  mg daily for depression and anxiety  Follow up: April 18th at 230  Call  Psychiatry at [phone] with issues.  For [facility] residents, [employer] is a 24/7 hotline you can call for assistance [[phone]]. Please call 563/075 or go to your closest Emergency Room if you feel unsafe. This includes thoughts of hurting yourself or anyone else, or having other troubles such as hearing voices, seeing visions, or having new and scary thoughts about the people around you.    Review with patient: Treatment plan reviewed with the patient.    Time Spent:  Prep time: 1 min  Direct patient time: 21 min  Documentation time: 5 min  Total time: 27  tapan Jacques, APRN-CNP

## 2025-01-31 NOTE — PATIENT INSTRUCTIONS
Plan/Recommendations:  Medications:    -Decrease gabapentin to 100 mg at bedtime for anxiety.  May go back to 200 mg at bedtime if anxiety increases   -Continue trazodone 100 mg at bedtime as needed for sleep   - Continue venlafaxine  mg daily for depression and anxiety  Follow up: April 18th at 230  Call  Psychiatry at [phone] with issues.  For [facility] residents, [employer] is a 24/7 hotline you can call for assistance [[phone]]. Please call 838/290 or go to your closest Emergency Room if you feel unsafe. This includes thoughts of hurting yourself or anyone else, or having other troubles such as hearing voices, seeing visions, or having new and scary thoughts about the people around you.

## 2025-02-07 ENCOUNTER — APPOINTMENT (OUTPATIENT)
Dept: UROLOGY | Facility: CLINIC | Age: 40
End: 2025-02-07
Payer: COMMERCIAL

## 2025-02-07 VITALS — BODY MASS INDEX: 33.75 KG/M2 | TEMPERATURE: 95.9 F | HEIGHT: 66 IN | WEIGHT: 210 LBS

## 2025-02-07 DIAGNOSIS — N39.41 URGE INCONTINENCE: ICD-10-CM

## 2025-02-07 DIAGNOSIS — R35.0 URINARY FREQUENCY: ICD-10-CM

## 2025-02-07 DIAGNOSIS — R31.29 MICROSCOPIC HEMATURIA: Primary | ICD-10-CM

## 2025-02-07 LAB
POC APPEARANCE, URINE: CLEAR
POC BILIRUBIN, URINE: NEGATIVE
POC BLOOD, URINE: NEGATIVE
POC COLOR, URINE: YELLOW
POC GLUCOSE, URINE: NEGATIVE MG/DL
POC KETONES, URINE: NEGATIVE MG/DL
POC LEUKOCYTES, URINE: NEGATIVE
POC NITRITE,URINE: NEGATIVE
POC PH, URINE: 5.5 PH
POC PROTEIN, URINE: NEGATIVE MG/DL
POC SPECIFIC GRAVITY, URINE: 1.01
POC UROBILINOGEN, URINE: 0.2 EU/DL

## 2025-02-07 PROCEDURE — 81003 URINALYSIS AUTO W/O SCOPE: CPT | Performed by: UROLOGY

## 2025-02-07 PROCEDURE — 3008F BODY MASS INDEX DOCD: CPT | Performed by: UROLOGY

## 2025-02-07 PROCEDURE — 99204 OFFICE O/P NEW MOD 45 MIN: CPT | Performed by: UROLOGY

## 2025-02-07 ASSESSMENT — PAIN SCALES - GENERAL: PAINLEVEL_OUTOF10: 0-NO PAIN

## 2025-02-07 NOTE — PROGRESS NOTES
Scribed for Dr. Jesusita Perry by Onel Cartagena. I, Dr. Jesusita Perry, have personally reviewed and agreed with the information entered by the Virtual Scribe. 25    History of Present Illness (HPI):  TODAY: (25)  Florecita Redmond is a 39 y.o. female presenting as a new patient with history of microscopic hematuria. She is a . With prior urine microanalysis (24) showing 6-10 RBC's, albeit she was on her menses. She c/o gradually worsening frequency, urgency, and UUI. Waxes and wanes in severity. At one point, states she completely lost control of her bladder and voided spontaneously with significant urgency. Urine culture was negative. Denies any flank pain or hx of stones.     Labs personally reviewed and independently interpreted.   Urine microanalysis (24) showed 6-10 RBC's.   Urine culture (24) negative.     Past Medical History:   Diagnosis Date    Allergic     Anxiety 2003    Depression     GERD (gastroesophageal reflux disease)     Headache     Personal history of other complications of pregnancy, childbirth and the puerperium 2021    History of postpartum depression     Past Surgical History:   Procedure Laterality Date     SECTION, LOW TRANSVERSE  2021    OTHER SURGICAL HISTORY  2021    Bilateral salpingectomy    OTHER SURGICAL HISTORY  2021     section low transverse    TUBAL LIGATION      WISDOM TOOTH EXTRACTION       Family History   Problem Relation Name Age of Onset    Other (cardiac disorder) Father Keanu Redmond     Glaucoma Father Keanu Redmond     Hypertension Father Keanu Redmond     Prostate cancer Father Keanu Redmond     Cancer Father Keanu Redmond     Heart disease Father Keanu Redmond     Depression Maternal Grandmother Cristel     Heart disease Mother Goldie Redmond      Social History     Tobacco Use   Smoking Status Never   Smokeless Tobacco Never     Current Outpatient Medications   Medication Sig Dispense Refill  "   albuterol 90 mcg/actuation inhaler Inhale 2 puffs every 4 hours if needed for wheezing. 18 g 1    fluticasone (Flonase) 50 mcg/actuation nasal spray Administer 1 spray into each nostril once daily. Shake gently. Before first use, prime pump. After use, clean tip and replace cap. 16 g 3    gabapentin (Neurontin) 100 mg capsule Take 2 capsules (200 mg) by mouth once daily at bedtime. 180 capsule 0    SUMAtriptan (Imitrex) 50 mg tablet Take 1 tablet (50 mg) by mouth 1 time if needed for migraine. May repeat after 2 hours. 27 tablet 3    traZODone (Desyrel) 100 mg tablet Take 1 tablet (100 mg) by mouth as needed at bedtime for sleep. 90 tablet 0    venlafaxine XR (Effexor-XR) 150 mg 24 hr capsule Take 1 capsule (150 mg) by mouth once daily in the morning. Take before meals. 90 capsule 0     No current facility-administered medications for this visit.     Allergies   Allergen Reactions    Prochlorperazine Anxiety     Past medical, surgical, family and social history in the chart was reviewed and is accurate including any additions to what is in this HPI.    Review of systems (ROS):   Pertinent information as listed in the HPI.        Objective   There were no vitals taken for this visit.  Physical Exam:  Constitutional: NAD  HEENT: AT/NC  Resp: Non labored respirations.  Skin: No jaundice or visible skin lesions.  Neuro: No focal deficits.  Psych: Appropriate mood and affect.    Lab Review:  Lab Results   Component Value Date    WBC 5.7 12/29/2023    RBC 4.53 12/29/2023    HGB 12.7 12/29/2023    HCT 39.5 12/29/2023     12/29/2023      Lab Results   Component Value Date    BUN 18 12/29/2023    CREATININE 0.82 12/29/2023      No results found for: \"PSA\", \"HGBA1C\"  Lab Results   Component Value Date    CHOL 215 (H) 12/29/2023    TRIG 93 12/29/2023    HDL 52.0 12/29/2023    ALT 11 12/29/2023    AST 14 12/29/2023     12/29/2023    K 4.6 12/29/2023     12/29/2023    CO2 27 12/29/2023    TSH 4.41 (H) " 12/17/2024        ASSESSMENT:  Problem List Items Addressed This Visit    None     PLAN:  #Microscopic hematuria  Urine microanalysis (12/17/24) showed 6-10 RBC's (while on menses).  Elects to repeat a microanalysis, if >3 RBC's will consider further work-up.     #OAB symptoms  Elects to proceed with CT stone protocol, as stones could be potential source of her urinary symptoms and hematuria. Discussed treatment options, and she elects to trial course of Gemtesa. Risks, benefits, and alternatives discussed. Schedule follow up for a med check.     All questions were answered to the patient’s satisfaction.  Patient agrees with the plan and wishes to proceed.  Continue follow-up for ongoing care of her chronic medical conditions.    Scribed for Dr. Jesusita Perry by Onel Cartagena.  I, Dr. Jesusita Perry, have personally reviewed and agreed with the information entered by the Virtual Scribe. 02/07/25

## 2025-02-08 LAB
BACTERIA #/AREA URNS HPF: ABNORMAL /HPF
HYALINE CASTS #/AREA URNS LPF: ABNORMAL /LPF
RBC #/AREA URNS HPF: ABNORMAL /HPF
SERVICE CMNT-IMP: ABNORMAL
SQUAMOUS #/AREA URNS HPF: ABNORMAL /HPF
WBC #/AREA URNS HPF: ABNORMAL /HPF

## 2025-02-14 ENCOUNTER — HOSPITAL ENCOUNTER (OUTPATIENT)
Dept: RADIOLOGY | Facility: HOSPITAL | Age: 40
Discharge: HOME | End: 2025-02-14
Payer: COMMERCIAL

## 2025-02-14 DIAGNOSIS — R31.29 MICROSCOPIC HEMATURIA: ICD-10-CM

## 2025-02-14 DIAGNOSIS — R35.0 URINARY FREQUENCY: ICD-10-CM

## 2025-02-14 PROCEDURE — 74176 CT ABD & PELVIS W/O CONTRAST: CPT

## 2025-02-26 DIAGNOSIS — J01.90 ACUTE NON-RECURRENT SINUSITIS, UNSPECIFIED LOCATION: ICD-10-CM

## 2025-02-26 RX ORDER — AMOXICILLIN 875 MG/1
875 TABLET, FILM COATED ORAL 2 TIMES DAILY
Qty: 20 TABLET | Refills: 0 | Status: SHIPPED | OUTPATIENT
Start: 2025-02-26 | End: 2025-03-08

## 2025-03-07 ENCOUNTER — APPOINTMENT (OUTPATIENT)
Dept: UROLOGY | Facility: CLINIC | Age: 40
End: 2025-03-07
Payer: COMMERCIAL

## 2025-03-07 DIAGNOSIS — R31.29 MICROSCOPIC HEMATURIA: ICD-10-CM

## 2025-03-07 DIAGNOSIS — R35.0 URINARY FREQUENCY: ICD-10-CM

## 2025-03-07 PROCEDURE — 99214 OFFICE O/P EST MOD 30 MIN: CPT | Performed by: UROLOGY

## 2025-03-07 NOTE — PROGRESS NOTES
Scribed for Dr. Jesusita Perry by Onel Cartagena. I, Dr. Jesusita Perry, have personally reviewed and agreed with the information entered by the Virtual Scribe. This visit was completed via telemedicine. All issues as below were discussed and addressed but no physical exam was performed unless allowed by visual confirmation. If it was felt that the patient should be evaluated in clinic, then they were directed there. Patient verbal consented to the visit. 03/07/25    History of Present Illness (HPI):  TODAY: (03/07/25)  Florecita Redmond is a 39 y.o. female with history of microscopic hematuria. She is a . With prior urine microanalysis (12/17/24) showing 6-10 RBC's, albeit she was on her menses. She c/o gradually worsening frequency, urgency, and UUI. Waxes and wanes in severity. At one point, states she completely lost control of her bladder and voided spontaneously with significant urgency. Urine culture was negative. Denies any flank pain or hx of stones.     Presents for virtual follow up, CT results, and a med check (Gemtesa). Repeat urine microanalysis was negative for blood (0 RBC's). CT (02/14/25) was unremarkable, no tumors, stones, or lesions visualized. She reports she wanted to wait for test results prior to starting Gemtesa, therefore has not started yet. Denies any acute or worsening symptoms. Denies any UTI's or gross hematuria in the interim.      CT A&P (02/14/25) personally reviewed and independently interpreted.   1. Unremarkable CT abdomen and pelvis.  2. No intrarenal or ureteral calculus.  3. Constipation.    Labs personally reviewed and independently interpreted.   Urine microanalysis (12/17/24) showed 6-10 RBC's.   Urine culture (12/17/24) negative.   Urine microanalysis (02/07/25) negative.     Past Medical History:   Diagnosis Date    Allergic     Anxiety 2003    Depression 2003    GERD (gastroesophageal reflux disease) 2007    Headache 2010    Personal history of other complications of  pregnancy, childbirth and the puerperium 2021    History of postpartum depression    Urinary incontinence 2025    Urinary tract infection      Past Surgical History:   Procedure Laterality Date     SECTION, LOW TRANSVERSE  2021    OTHER SURGICAL HISTORY  2021    Bilateral salpingectomy    OTHER SURGICAL HISTORY  2021     section low transverse    TUBAL LIGATION      WISDOM TOOTH EXTRACTION       Family History   Problem Relation Name Age of Onset    Other (cardiac disorder) Father Keanu Redmond     Glaucoma Father Keanu Redmond     Hypertension Father Keanu Redmond     Prostate cancer Father Keanu Redmond     Cancer Father Keanu Redmond     Heart disease Father Keanu Redmond     Depression Maternal Grandmother Cristel     Heart disease Mother Goldie Redmond      Social History     Tobacco Use   Smoking Status Never   Smokeless Tobacco Never     Current Outpatient Medications   Medication Sig Dispense Refill    albuterol 90 mcg/actuation inhaler Inhale 2 puffs every 4 hours if needed for wheezing. 18 g 1    amoxicillin (Amoxil) 875 mg tablet Take 1 tablet (875 mg) by mouth 2 times a day for 10 days. 20 tablet 0    fluticasone (Flonase) 50 mcg/actuation nasal spray Administer 1 spray into each nostril once daily. Shake gently. Before first use, prime pump. After use, clean tip and replace cap. 16 g 3    gabapentin (Neurontin) 100 mg capsule Take 2 capsules (200 mg) by mouth once daily at bedtime. 180 capsule 0    SUMAtriptan (Imitrex) 50 mg tablet Take 1 tablet (50 mg) by mouth 1 time if needed for migraine. May repeat after 2 hours. 27 tablet 3    traZODone (Desyrel) 100 mg tablet Take 1 tablet (100 mg) by mouth as needed at bedtime for sleep. 90 tablet 0    venlafaxine XR (Effexor-XR) 150 mg 24 hr capsule Take 1 capsule (150 mg) by mouth once daily in the morning. Take before meals. 90 capsule 0    vibegron 75 mg tablet Take 1 tablet (75 mg) by mouth early in the  "morning.. 30 tablet 0     No current facility-administered medications for this visit.     Allergies   Allergen Reactions    Prochlorperazine Anxiety     Past medical, surgical, family and social history in the chart was reviewed and is accurate including any additions to what is in this HPI.    Review of systems (ROS):   Pertinent information as listed in the HPI.        Objective   There were no vitals taken for this visit.  Physical Exam:  LIMITED 2/2 BEING A TELEHEALTH VISIT.  Genitourinary:  UNABLE TO ADEQUATELY ASSESS VIA VIDEO FORMAT.  CONSTITUTIONAL:        No acute distress    HEAD:        Normocephalic and atraumatic    CHEST / RESPIRATORY      no excess work of breathing, no respiratory distress,    ABDOMEN / GASTROINTESTINAL:        Abdomen nondistended      Lab Review:  Lab Results   Component Value Date    WBC 5.7 12/29/2023    RBC 4.53 12/29/2023    HGB 12.7 12/29/2023    HCT 39.5 12/29/2023     12/29/2023      Lab Results   Component Value Date    BUN 18 12/29/2023    CREATININE 0.82 12/29/2023      No results found for: \"PSA\", \"HGBA1C\"  Lab Results   Component Value Date    CHOL 215 (H) 12/29/2023    TRIG 93 12/29/2023    HDL 52.0 12/29/2023    ALT 11 12/29/2023    AST 14 12/29/2023     12/29/2023    K 4.6 12/29/2023     12/29/2023    CO2 27 12/29/2023    TSH 4.41 (H) 12/17/2024        ASSESSMENT:  Problem List Items Addressed This Visit    None     PLAN:  #Microscopic hematuria  Urine microanalysis (12/17/24) showed 6-10 RBC's (while on menses).  Repeat microanalysis was negative.   CT was negative for upper tract source.   No further work-up indicated, provided reassurance.     #OAB symptoms  Discussed options, and elects to follow up with PFPT for an evaluation.   Referral provided, if no benefit advised to call or return to discuss options including Gemtesa/Myrbetriq.     Discussion:  Discussed AUA guidelines for overactive bladder symptoms (OAB).  Reviewed behavioral " medications, medications, and third-line procedures.   Patient agrees to proceed with behavioral modifications, timed voids, and monitoring bladder irritants.    We discussed behavioral modifications that can contribute to lessening irritative symptoms, namely evening fluid restriction, elevating one's legs for a while before bedtime, voiding right before bedtime, and avoiding bladder irritants like caffeine, carbonated beverages, dark-colored beverages, artificial sweeteners, acidic foods and beverages, and spicy foods. We discussed treatment options including anticholinergics, myrbetriq, bladder botox and PTNS.    In addition, advised the following:  Avoid caffeine  Avoid/restrict fluids altogether after 6 pm.      I spent 30 minutes of dedicated E&M time, including preparation and review of records, notes, and data, time spent with patient/family, and documentation.     All questions were answered to the patient’s satisfaction.  Patient agrees with the plan and wishes to proceed.  Continue follow-up for ongoing care of her chronic medical conditions.    Scribed for Dr. Jesusita Perry by Onel Cartagena.  I, Dr. Jesusita Perry, have personally reviewed and agreed with the information entered by the Virtual Scribe. 03/07/25

## 2025-04-18 ENCOUNTER — APPOINTMENT (OUTPATIENT)
Dept: BEHAVIORAL HEALTH | Facility: CLINIC | Age: 40
End: 2025-04-18
Payer: COMMERCIAL

## 2025-04-18 DIAGNOSIS — F41.1 GAD (GENERALIZED ANXIETY DISORDER): ICD-10-CM

## 2025-04-18 DIAGNOSIS — F41.9 ANXIETY AND DEPRESSION: ICD-10-CM

## 2025-04-18 DIAGNOSIS — F51.01 PRIMARY INSOMNIA: ICD-10-CM

## 2025-04-18 DIAGNOSIS — F33.42 RECURRENT MAJOR DEPRESSIVE DISORDER, IN FULL REMISSION: ICD-10-CM

## 2025-04-18 DIAGNOSIS — F32.A ANXIETY AND DEPRESSION: ICD-10-CM

## 2025-04-18 PROCEDURE — 99213 OFFICE O/P EST LOW 20 MIN: CPT

## 2025-04-18 RX ORDER — VENLAFAXINE HYDROCHLORIDE 150 MG/1
150 CAPSULE, EXTENDED RELEASE ORAL
Qty: 90 CAPSULE | Refills: 0 | Status: SHIPPED | OUTPATIENT
Start: 2025-04-18

## 2025-04-18 RX ORDER — GABAPENTIN 100 MG/1
200 CAPSULE ORAL NIGHTLY
Qty: 180 CAPSULE | Refills: 0 | Status: SHIPPED | OUTPATIENT
Start: 2025-04-18

## 2025-04-18 RX ORDER — TRAZODONE HYDROCHLORIDE 100 MG/1
100 TABLET ORAL NIGHTLY PRN
Qty: 90 TABLET | Refills: 0 | Status: SHIPPED | OUTPATIENT
Start: 2025-04-18

## 2025-04-18 NOTE — PROGRESS NOTES
Outpatient Psychiatry- Follow up visit    Subjective   Florecita Redmond, a 39 y.o. female, presenting for follow up visit for   Chief Complaint   Patient presents with    MDD (Major Depressive Disorder)    Anxiety      Virtual or Telephone Consent    An interactive audio and video telecommunication system which permits real time communications between the patient (at the originating site) and provider (at the distant site) was utilized to provide this telehealth service.   Verbal consent was requested and obtained from Florecita Redmond on this date, 04/18/25 for a telehealth visit.     HPI:  Florecita states things are good. Decided to stay at 200 mg of gabapentin at bedtime due to anxiety.  Has had increased anxiety related to the state of the world currently. Considering going back to her therapist to work through her feelings. Feels anxiety is manageable currently.    Mood has been good; helps that the weather is improving. Denies SI/HI and passive thoughts of death.     Has been a little harder to fall asleep; has been taking her meds a bit late so taking longer to fall asleep. Manageable.     Going to see endocrinology for thyroid next week.  TSH trend has been elevated.     Speaks at a conference in June for work in [place].     Per previous HPI:  Reports things are going well, holidays were good. Went to [place] for a week. Mood is good, denies any depressive sx with the winter months.  Anxiety is OK, work has been stressful, causing migraines. Thinks it will slow down soon, getting a migraine daily. Takes sumatriptan as needed.    Sleep is good, daughter has been disruptive to her sleep with her going to the [place] but getting 7-8 hrs of sleep.         Psychiatric Review Of Systems:  Depressive Symptoms: negative  Manic Symptoms: negative  Anxiety Symptoms: General Anxiety Disorder (JOSE)JOSE Behaviors: manageable  Psychotic Symptoms: negative  Trauma Symptoms: None  Other Symptoms/Concerns: insomnia- going well with  meds  Delirium/Altered Mental Status Symptoms:Other: (comment) WNL    Current Medications:    Current Outpatient Medications:     albuterol 90 mcg/actuation inhaler, Inhale 2 puffs every 4 hours if needed for wheezing., Disp: 18 g, Rfl: 1    fluticasone (Flonase) 50 mcg/actuation nasal spray, Administer 1 spray into each nostril once daily. Shake gently. Before first use, prime pump. After use, clean tip and replace cap., Disp: 16 g, Rfl: 3    gabapentin (Neurontin) 100 mg capsule, Take 2 capsules (200 mg) by mouth once daily at bedtime., Disp: 180 capsule, Rfl: 0    SUMAtriptan (Imitrex) 50 mg tablet, Take 1 tablet (50 mg) by mouth 1 time if needed for migraine. May repeat after 2 hours., Disp: 27 tablet, Rfl: 3    traZODone (Desyrel) 100 mg tablet, Take 1 tablet (100 mg) by mouth as needed at bedtime for sleep., Disp: 90 tablet, Rfl: 0    venlafaxine XR (Effexor-XR) 150 mg 24 hr capsule, Take 1 capsule (150 mg) by mouth once daily in the morning. Take before meals., Disp: 90 capsule, Rfl: 0    Medical History:  Past Medical History:   Diagnosis Date    Allergic     Anxiety 2003    Depression 2003    GERD (gastroesophageal reflux disease) 2007    Headache 2010    Personal history of other complications of pregnancy, childbirth and the puerperium 07/29/2021    History of postpartum depression    Urinary incontinence December 2025    Urinary tract infection 2005       Past Psychiatric History:   Onset History: Depression/anxiety at 18 yo.   Inpatient history: 2022 [facility].   Suicide attempts/Self-Harm/Ideation History: SI in 2022.   Past providers: Lisa Al NP, Dr Dick De Los Santos, [facility].   Past medication trials: Viibryd- made depression worse, Lexapro- not effective, Latuda- 1st time helped, 2nd time not effective, bupropion  mg possible tinnitus, buspirone 5 mg BID, trazodone 50 mg, hydroxyzine 50 mg.       Family Psychiatric History  Maternal Grandmother    · Family history of  "depression      Social History:   Upbringing: Born and raised in [place]. One older sister. Parents , overall childhood was loving and supportive.   Trauma: Denies hx of abuse  Education: Doctorate in Veterinary Medicine  Work:  Oncologist   Marital Status:   Children: 2 children. boy 5 yo, 18 mo girl  Living situation: Lived with  and children  : Denies  Legal: Denies  Access to Weapons: No firearms in household  Guardian/POA/Payee:  self    Substance Use History:  Tobacco use: denies  Use of alcohol: denied  Use of caffeine: caffeinated soft drinks 2 /day  Use of other substances: denies  Legal consequences of substance use: denies  Substance use disorder treatment: n/a    Record Review: brief     Medical Review Of Systems:  A comprehensive review of systems was negative.    MEDICAL HISTORY  -PCP: HALLE Greene-CNP  -Pt reports currently is not pregnant, and currently is sexually active, had a tubal. LMP: 4/15/25    -TBI/head trauma/LOC/seizure hx: denies      Objective   Mental Status Exam  Appearance: Neat and clean in appearance, dressed appropriately.   Attitude: Calm, cooperative, and engaged in conversation.  Behavior: Appropriate eye contact.   Motor Activity: No psychomotor agitation or retardation. No abnormal movements, tremors or tics. No evidence of extrapyramidal symptoms or tardive dyskinesia.  Speech: Regular rate, rhythm, volume. Spontaneous, no pressured speech.  Mood: \"good\"  Affect: Euthymic, full range, mood congruent.  Thought Process: Linear, logical, and goal-directed. No loose associations or gross thought disorganization.  Thought Content: Denied current suicidal ideation or thoughts of harm to self, denied homicidal ideation or thoughts of harm to others. No delusional thinking elicited. No perseverations or obsessions identified.   Perception: Did not endorse auditory or visual hallucinations, did not appear to be responding to hallucinatory " stimuli.   Cognition: Alert, oriented x3. Preserved attention span and concentration, recent and remote memory. Adequate fund of knowledge. No deficits in language.   Insight: Good, in regards to understanding mental health condition  Judgement: Intact      Vitals:  There were no vitals filed for this visit.    Risk Assessment:  SI/HI ASSESSMENT  -Risk Assessment: Florecita Redmond is currently a low acute risk of suicide and self-harm due to no past suicide attempt(s) and not currently endorsing thoughts of suicide. Florecita Redmond is currently a low acute risk of violence and harm to others due to no past history of violence and not currently threatening others.  -Suicidal Risk Factors:   -Violence Risk Factors: none  -Protective Factors: sense of responsibility towards family, social support/connectedness, child related concerns/living with child <18 years, positive family relationships, hopefulness/future orientation, marriage/partnership, and employment  -Plan to Reduce Risk: Establish medication regimen and outpatient follow-up care    Florecita was seen today for mdd (major depressive disorder) and anxiety.  Diagnoses and all orders for this visit:  JOSE (generalized anxiety disorder)        Impression:  Florecita decided to leave her gabapentin at 200 mg at bedtime; anxiety has been elevated due to current events.  Considering going back to see her therapist to work through some of her emotions.  Overall mood is stable.  Discussed treatment plan, will continue current medication regimen and plan to follow-up in 3 months.      Plan/Recommendations:  Medications:    - Continue gabapentin 200 mg at bedtime for anxiety.     -Continue trazodone 100 mg at bedtime as needed for sleep   - Continue venlafaxine  mg daily for depression and anxiety  Follow up: In July as scheduled  Call  Psychiatry at [phone] with issues.  For [facility] residents, [place] is a 24/7 hotline you can call for assistance  [[phone]]. Please call [phone] or go to your closest Emergency Room if you feel unsafe. This includes thoughts of hurting yourself or anyone else, or having other troubles such as hearing voices, seeing visions, or having new and scary thoughts about the people around you.    Review with patient: Treatment plan reviewed with the patient.    Time Spent:  Prep time: 1 min  Direct patient time: 16 min  Documentation time: 4 min  Total time: 21 min    HALLE Koenig-CNP

## 2025-04-22 NOTE — PATIENT INSTRUCTIONS
Plan/Recommendations:  Medications:    - Continue gabapentin 200 mg at bedtime for anxiety.     -Continue trazodone 100 mg at bedtime as needed for sleep   - Continue venlafaxine  mg daily for depression and anxiety  Follow up: In July as scheduled  Call  Psychiatry at [phone] with issues.  For [facility] residents, [place] is a 24/7 hotline you can call for assistance [[phone]]. Please call [phone] or go to your closest Emergency Room if you feel unsafe. This includes thoughts of hurting yourself or anyone else, or having other troubles such as hearing voices, seeing visions, or having new and scary thoughts about the people around you.

## 2025-04-23 ENCOUNTER — APPOINTMENT (OUTPATIENT)
Dept: ENDOCRINOLOGY | Facility: CLINIC | Age: 40
End: 2025-04-23
Payer: COMMERCIAL

## 2025-04-23 VITALS
BODY MASS INDEX: 37.28 KG/M2 | DIASTOLIC BLOOD PRESSURE: 82 MMHG | SYSTOLIC BLOOD PRESSURE: 114 MMHG | WEIGHT: 232 LBS | HEIGHT: 66 IN

## 2025-04-23 DIAGNOSIS — E06.3 HASHIMOTO'S THYROIDITIS: ICD-10-CM

## 2025-04-23 DIAGNOSIS — R94.6 ABNORMAL THYROID FUNCTION TEST: ICD-10-CM

## 2025-04-23 DIAGNOSIS — E03.8 SUBCLINICAL HYPOTHYROIDISM: Primary | ICD-10-CM

## 2025-04-23 DIAGNOSIS — R73.01 IMPAIRED FASTING GLUCOSE: ICD-10-CM

## 2025-04-23 PROCEDURE — 3008F BODY MASS INDEX DOCD: CPT | Performed by: INTERNAL MEDICINE

## 2025-04-23 PROCEDURE — 99204 OFFICE O/P NEW MOD 45 MIN: CPT | Performed by: INTERNAL MEDICINE

## 2025-04-23 NOTE — PROGRESS NOTES
Patient ID: Florecita Redmond is a 39 y.o. female who presents for New Patient Visit (Endocrine consult. Referred by Ghislaine Munguia CNP for abnormal thyroid.).  HPI  The patient is referred for evaluation of subclinical hypothyroidism secondary to Hashimoto's.    This is a 39-year-old female who has had slightly abnormal TSH ranging between 4.41 and 5.03 since 2022.    In December she had a TSH of 4.41 with a free T4 of 0.78 and a TPO of 537.    Does complain of fatigue and weight gain as well as heavy periods but also complains of anxiety and sweats.    She has no other symptoms suggestive of hyper or hypothyroidism.    There is no family history of thyroid.    She has a past history of depression anxiety hyperlipidemia impaired fasting glucose SVT post ablation.    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    Family history positive for diabetes in her maternal grandfather    ROS  Comprehensive review of systems is negative.    Objective   Physical Exam  Visit Vitals  /82      Vitals:    04/23/25 0924   Weight: 105 kg (232 lb)      Body mass index is 37.45 kg/m².      Alert and oriented x3  In no distress  No focal neurologic deficits  No supraclavicular, or dorsal fat  No purple striae  Integument intact  Eyes normal  ENT normal. No adenopathy  Thyroid palpable and normal. No nodules  Chest clear to auscultation  Heart sounds are normal  Abdomen nontender. Bowel sounds normal. No organomegaly  Feet are okay  Reflexes normal with normal return    Current Medications[1]    Assessment/Plan     1.  Subclinical hypothyroidism  2.  Hashimoto's  3.  Impaired fasting glucose  4.  Hyperlipidemia    We reviewed her blood work.    We discussed the diagnosis.    Discussed her risk for developing overt hypothyroidism.    We discussed thyroid hormone replacement.    We discussed variables are going to thyroid hormone requirements.    We discussed thyroid symptoms as well as their  nonspecificity.    We discussed insulin resistance this pathophysiology and its impact.    Will check TSH BMP and hemoglobin A1c today.    If she is still subclinical we discussed following at this point.    She will follow-up with me in 1 year sooner as needed.         [1]   Current Outpatient Medications   Medication Sig Dispense Refill    gabapentin (Neurontin) 100 mg capsule Take 2 capsules (200 mg) by mouth once daily at bedtime. 180 capsule 0    SUMAtriptan (Imitrex) 50 mg tablet Take 1 tablet (50 mg) by mouth 1 time if needed for migraine. May repeat after 2 hours. 27 tablet 3    traZODone (Desyrel) 100 mg tablet Take 1 tablet (100 mg) by mouth as needed at bedtime for sleep. 90 tablet 0    venlafaxine XR (Effexor-XR) 150 mg 24 hr capsule Take 1 capsule (150 mg) by mouth once daily in the morning. Take before meals. 90 capsule 0    albuterol 90 mcg/actuation inhaler Inhale 2 puffs every 4 hours if needed for wheezing. 18 g 1    fluticasone (Flonase) 50 mcg/actuation nasal spray Administer 1 spray into each nostril once daily. Shake gently. Before first use, prime pump. After use, clean tip and replace cap. 16 g 3     No current facility-administered medications for this visit.

## 2025-04-24 LAB
ANION GAP SERPL CALCULATED.4IONS-SCNC: 9 MMOL/L (CALC) (ref 7–17)
BUN SERPL-MCNC: 14 MG/DL (ref 7–25)
BUN/CREAT SERPL: NORMAL (CALC) (ref 6–22)
CALCIUM SERPL-MCNC: 9.3 MG/DL (ref 8.6–10.2)
CHLORIDE SERPL-SCNC: 104 MMOL/L (ref 98–110)
CO2 SERPL-SCNC: 27 MMOL/L (ref 20–32)
CREAT SERPL-MCNC: 0.84 MG/DL (ref 0.5–0.99)
EGFRCR SERPLBLD CKD-EPI 2021: 90 ML/MIN/1.73M2
EST. AVERAGE GLUCOSE BLD GHB EST-MCNC: 117 MG/DL
EST. AVERAGE GLUCOSE BLD GHB EST-SCNC: 6.5 MMOL/L
GLUCOSE SERPL-MCNC: 86 MG/DL (ref 65–139)
HBA1C MFR BLD: 5.7 %
POTASSIUM SERPL-SCNC: 4.6 MMOL/L (ref 3.5–5.3)
SODIUM SERPL-SCNC: 140 MMOL/L (ref 135–146)
TSH SERPL-ACNC: 3.63 MIU/L

## 2025-07-24 ENCOUNTER — PATIENT MESSAGE (OUTPATIENT)
Dept: BEHAVIORAL HEALTH | Facility: CLINIC | Age: 40
End: 2025-07-24
Payer: COMMERCIAL

## 2025-07-24 DIAGNOSIS — F33.42 RECURRENT MAJOR DEPRESSIVE DISORDER, IN FULL REMISSION: ICD-10-CM

## 2025-07-25 RX ORDER — VENLAFAXINE HYDROCHLORIDE 150 MG/1
150 CAPSULE, EXTENDED RELEASE ORAL
Qty: 90 CAPSULE | Refills: 0 | Status: SHIPPED | OUTPATIENT
Start: 2025-07-25

## 2025-08-01 ENCOUNTER — APPOINTMENT (OUTPATIENT)
Dept: BEHAVIORAL HEALTH | Facility: CLINIC | Age: 40
End: 2025-08-01
Payer: COMMERCIAL

## 2025-08-01 DIAGNOSIS — F41.9 ANXIETY AND DEPRESSION: ICD-10-CM

## 2025-08-01 DIAGNOSIS — F32.A ANXIETY AND DEPRESSION: ICD-10-CM

## 2025-08-01 DIAGNOSIS — F41.1 GAD (GENERALIZED ANXIETY DISORDER): ICD-10-CM

## 2025-08-01 DIAGNOSIS — F51.01 PRIMARY INSOMNIA: ICD-10-CM

## 2025-08-01 DIAGNOSIS — F33.42 RECURRENT MAJOR DEPRESSIVE DISORDER, IN FULL REMISSION: ICD-10-CM

## 2025-08-01 PROCEDURE — 99214 OFFICE O/P EST MOD 30 MIN: CPT

## 2025-08-01 PROCEDURE — 1036F TOBACCO NON-USER: CPT

## 2025-08-01 RX ORDER — TRAZODONE HYDROCHLORIDE 100 MG/1
100 TABLET ORAL NIGHTLY PRN
Qty: 90 TABLET | Refills: 0 | Status: SHIPPED | OUTPATIENT
Start: 2025-08-01

## 2025-08-01 RX ORDER — GABAPENTIN 100 MG/1
200 CAPSULE ORAL NIGHTLY
Qty: 180 CAPSULE | Refills: 0 | Status: SHIPPED | OUTPATIENT
Start: 2025-08-01

## 2025-08-01 RX ORDER — VENLAFAXINE HYDROCHLORIDE 150 MG/1
150 CAPSULE, EXTENDED RELEASE ORAL
Qty: 90 CAPSULE | Refills: 0 | Status: SHIPPED | OUTPATIENT
Start: 2025-08-01

## 2025-08-01 NOTE — PROGRESS NOTES
Outpatient Psychiatry- Follow up visit    Subjective   Florecita Redmond, a 40 y.o. female, presenting for follow up visit for   Chief Complaint   Patient presents with    Anxiety    MDD (Major Depressive Disorder)      Virtual or Telephone Consent    An interactive audio and video telecommunication system which permits real time communications between the patient (at the originating site) and provider (at the distant site) was utilized to provide this telehealth service.   Verbal consent was requested and obtained from Florecita Redmond on this date, 08/01/25 for a telehealth visit.     HPI:  Florecita's summer has been good; has done some fun things and going to the beach next week.     Mood has been fine, good. [place] the summer, spending time outside and the pool. Denies depressive sx, and SI/HI.     Anxiety is fine, no change.     NOTE: Symptom scale is rated where 0 = no symptoms at all, and 10 = symptoms so severe that pt is an imminent danger to themselves or others and requires hospitalization.    Anxiety remains present less days than not, which has remained unchanged over the past few weeks. Rates the severity of psych symptoms as a 2/10, noting symptom improvement with spending time outside and worsening of symptoms with work stress and background noise.    Doing better coping with the current political climate; being the summer she has enough to distract herself with.     Per previous HPI:  Florecita states things are good. Decided to stay at 200 mg of gabapentin at bedtime due to anxiety.  Has had increased anxiety related to the state of the world currently. Considering going back to her therapist to work through her feelings. Feels anxiety is manageable currently.    Mood has been good; helps that the weather is improving. Denies SI/HI and passive thoughts of death.     Has been a little harder to fall asleep; has been taking her meds a bit late so taking longer to fall asleep. Manageable.     Going to see endocrinology  for thyroid next week.  TSH trend has been elevated.     Speaks at a conference in June for work in [place].     Psychiatric Review Of Systems:  Depressive Symptoms: negative  Manic Symptoms: negative  Anxiety Symptoms: General Anxiety Disorder (JOSE)JOSE Behaviors: manageable  Psychotic Symptoms: negative  Trauma Symptoms: None  Other Symptoms/Concerns: insomnia- going well with meds  Delirium/Altered Mental Status Symptoms:Other: (comment) WNL    Current Medications:    Current Outpatient Medications:     albuterol 90 mcg/actuation inhaler, Inhale 2 puffs every 4 hours if needed for wheezing., Disp: 18 g, Rfl: 1    fluticasone (Flonase) 50 mcg/actuation nasal spray, Administer 1 spray into each nostril once daily. Shake gently. Before first use, prime pump. After use, clean tip and replace cap., Disp: 16 g, Rfl: 3    gabapentin (Neurontin) 100 mg capsule, Take 2 capsules (200 mg) by mouth once daily at bedtime., Disp: 180 capsule, Rfl: 0    SUMAtriptan (Imitrex) 50 mg tablet, Take 1 tablet (50 mg) by mouth 1 time if needed for migraine. May repeat after 2 hours., Disp: 27 tablet, Rfl: 3    traZODone (Desyrel) 100 mg tablet, Take 1 tablet (100 mg) by mouth as needed at bedtime for sleep., Disp: 90 tablet, Rfl: 0    venlafaxine XR (Effexor-XR) 150 mg 24 hr capsule, Take 1 capsule (150 mg) by mouth once daily in the morning. Take before meals., Disp: 90 capsule, Rfl: 0    Medical History:  Past Medical History:   Diagnosis Date    Allergic     Anxiety 2003    Depression 2003    GERD (gastroesophageal reflux disease) 2007    Headache 2010    Personal history of other complications of pregnancy, childbirth and the puerperium 07/29/2021    History of postpartum depression    Urinary incontinence December 2025    Urinary tract infection 2005       Past Psychiatric History:   Onset History: Depression/anxiety at 18 yo.   Inpatient history: 2022 [place].   Suicide attempts/Self-Harm/Ideation History: SI in 2022.   Past  "providers: Lisa Al, CHRISTOPHER, Dr Dick De Los Santos, [facility].   Past medication trials: Viibryd- made depression worse, Lexapro- not effective, Latuda- 1st time helped, 2nd time not effective, bupropion  mg possible tinnitus, buspirone 5 mg BID, trazodone 50 mg, hydroxyzine 50 mg.       Family Psychiatric History  Maternal Grandmother    · Family history of depression      Social History:   Upbringing: Born and raised in [place]. One older sister. Parents , overall childhood was loving and supportive.   Trauma: Denies hx of abuse  Education: Doctorate in Veterinary Medicine  Work:  Oncologist   Marital Status:   Children: 2 children. boy 5 yo, 18 mo girl  Living situation: Lived with  and children  : Denies  Legal: Denies  Access to Weapons: No firearms in household  Guardian/POA/Payee:  self    Substance Use History:  Tobacco use: denies  Use of alcohol: denied  Use of caffeine: caffeinated soft drinks 2 /day  Use of other substances: denies  Legal consequences of substance use: denies  Substance use disorder treatment: n/a    Record Review: brief     Medical Review Of Systems:  A comprehensive review of systems was negative.    MEDICAL HISTORY  -PCP: Ghislaine Munguia, APRN-CNP  -Pt reports currently is not pregnant, and currently is sexually active, had a tubal. LMP: 7/31/25    -TBI/head trauma/LOC/seizure hx: denies      Objective   Mental Status Exam  Appearance: Neat and clean in appearance, dressed appropriately.   Attitude: Calm, cooperative, and engaged in conversation.  Behavior: Appropriate eye contact.   Motor Activity: No psychomotor agitation or retardation. No abnormal movements, tremors or tics. No evidence of extrapyramidal symptoms or tardive dyskinesia.  Speech: Regular rate, rhythm, volume. Spontaneous, no pressured speech.  Mood: \"good\"  Affect: Euthymic, full range, mood congruent.  Thought Process: Linear, logical, and goal-directed. No loose " associations or gross thought disorganization.  Thought Content: Denied current suicidal ideation or thoughts of harm to self, denied homicidal ideation or thoughts of harm to others. No delusional thinking elicited. No perseverations or obsessions identified.   Perception: Did not endorse auditory or visual hallucinations, did not appear to be responding to hallucinatory stimuli.   Cognition: Alert, oriented x3. Preserved attention span and concentration, recent and remote memory. Adequate fund of knowledge. No deficits in language.   Insight: Good, in regards to understanding mental health condition  Judgement: Intact      Vitals:  There were no vitals filed for this visit.    Risk Assessment:  SI/HI ASSESSMENT  -Risk Assessment: Florecita Redmond is currently a low acute risk of suicide and self-harm due to no past suicide attempt(s) and not currently endorsing thoughts of suicide. Florecita Redmond is currently a low acute risk of violence and harm to others due to no past history of violence and not currently threatening others.  -Suicidal Risk Factors:   -Violence Risk Factors: none  -Protective Factors: sense of responsibility towards family, social support/connectedness, child related concerns/living with child <18 years, positive family relationships, hopefulness/future orientation, marriage/partnership, and employment  -Plan to Reduce Risk: Establish medication regimen and outpatient follow-up care    Florecita was seen today for anxiety and mdd (major depressive disorder).  Diagnoses and all orders for this visit:  JOSE (generalized anxiety disorder)  -     Follow Up In Psychiatry  Recurrent major depressive disorder, in full remission  -     Follow Up In Psychiatry    Impression:  Florecita is doing well; mood and anxiety are stable.  She is during the summer months which help with stress related to current political climate. Discussed treatment plan, will continue current medication regimen and plan to follow-up in  4 months.      Plan/Recommendations:  Medications:    - Continue gabapentin 200 mg at bedtime for anxiety.     -Continue trazodone 100 mg at bedtime as needed for sleep   - Continue venlafaxine  mg daily for depression and anxiety  Follow up: In December as scheduled  Call  Psychiatry at [phone] with issues.  For [facility] residents, [place] is a 24/7 hotline you can call for assistance [940.331.8949]. Please call [phone] or go to your closest Emergency Room if you feel unsafe. This includes thoughts of hurting yourself or anyone else, or having other troubles such as hearing voices, seeing visions, or having new and scary thoughts about the people around you.    Review with patient: Treatment plan reviewed with the patient.    Time Spent:  Prep time: 1 min  Direct patient time: 18 min  Documentation time: 3 min  Total time: 21 min    Mary Alice Jacques, HALLE-CNP

## 2025-08-01 NOTE — PATIENT INSTRUCTIONS
Plan/Recommendations:  Medications:    - Continue gabapentin 200 mg at bedtime for anxiety.     -Continue trazodone 100 mg at bedtime as needed for sleep   - Continue venlafaxine  mg daily for depression and anxiety  Follow up: In December as scheduled  Call  Psychiatry at [phone] with issues.  For [facility] residents, [place] is a 24/7 hotline you can call for assistance [[phone]]. Please call [phone] or go to your closest Emergency Room if you feel unsafe. This includes thoughts of hurting yourself or anyone else, or having other troubles such as hearing voices, seeing visions, or having new and scary thoughts about the people around you.

## 2025-12-05 ENCOUNTER — APPOINTMENT (OUTPATIENT)
Dept: BEHAVIORAL HEALTH | Facility: CLINIC | Age: 40
End: 2025-12-05
Payer: COMMERCIAL

## 2026-01-09 ENCOUNTER — APPOINTMENT (OUTPATIENT)
Dept: PRIMARY CARE | Facility: CLINIC | Age: 41
End: 2026-01-09
Payer: COMMERCIAL

## 2026-03-06 ENCOUNTER — APPOINTMENT (OUTPATIENT)
Dept: NEUROLOGY | Facility: CLINIC | Age: 41
End: 2026-03-06
Payer: COMMERCIAL

## 2026-04-24 ENCOUNTER — APPOINTMENT (OUTPATIENT)
Dept: ENDOCRINOLOGY | Facility: CLINIC | Age: 41
End: 2026-04-24
Payer: COMMERCIAL